# Patient Record
Sex: FEMALE | Race: WHITE | NOT HISPANIC OR LATINO | URBAN - METROPOLITAN AREA
[De-identification: names, ages, dates, MRNs, and addresses within clinical notes are randomized per-mention and may not be internally consistent; named-entity substitution may affect disease eponyms.]

---

## 2023-03-21 ENCOUNTER — TELEPHONE (OUTPATIENT)
Dept: SCHEDULING | Facility: CLINIC | Age: 68
End: 2023-03-21
Payer: COMMERCIAL

## 2023-03-21 NOTE — TELEPHONE ENCOUNTER
New Patient Appointment 5/8/23    Name of caller: Lurdes Martinez     Reason for Visit: Stress test     Insurance: personal Choice    Recent Cardiac Test/Procedures: none     Referred by: Felipe Drake Sr., DO    Previous Cardiologist name and phone number: none     Best contact number: [phone]    Additional notes/History: pt would like a sooner appt

## 2023-03-22 NOTE — TELEPHONE ENCOUNTER
New Patient Visit Questionnaire  Use the F2 key to move through the asterisks.  Reason for appointment? SOB    Who referred you: PCP    Name of primary care physician EJ GRIFFITH    Has the patient ever been seen by a cardiologist before?  NO               If YES, please obtain name and location of previous cardiologist.  Do you give us permission to obtain Medical records from the Providers listed? YES    Have you had any recent lab work performed? YES      If yes, where was this performed? DR GRIFFITH'S OFFICE    Have you ever had any cardiac testing (echo, stress test, carotid or aortic ultrasounds, cardiac MRI, etc.) NO    Have you ever had a cardiac catheterization, angioplasty, stent or heart surgery?  NO    Have you had any recent hospitalizations?  NO    If the patient has had previous testing/hospitalization, please determine where and when this was performed.  If the patient has copies of these reports or discharge summaries, please instruct them bring records to the visit.     PATIENT INSTRUCTIONS   Please bring a list of all your medications with the name of the medication, the dosage and how frequently you take the medication.  If you do not have a list, please bring all of your medication bottles with you.

## 2023-05-08 ENCOUNTER — TELEPHONE (OUTPATIENT)
Dept: CARDIOLOGY | Facility: CLINIC | Age: 68
End: 2023-05-08

## 2023-05-08 ENCOUNTER — OFFICE VISIT (OUTPATIENT)
Dept: CARDIOLOGY | Facility: CLINIC | Age: 68
End: 2023-05-08
Payer: COMMERCIAL

## 2023-05-08 VITALS
WEIGHT: 164 LBS | HEIGHT: 68 IN | RESPIRATION RATE: 16 BRPM | DIASTOLIC BLOOD PRESSURE: 90 MMHG | HEART RATE: 75 BPM | BODY MASS INDEX: 24.86 KG/M2 | SYSTOLIC BLOOD PRESSURE: 140 MMHG

## 2023-05-08 DIAGNOSIS — R06.09 DOE (DYSPNEA ON EXERTION): Primary | ICD-10-CM

## 2023-05-08 DIAGNOSIS — R06.02 SHORTNESS OF BREATH: ICD-10-CM

## 2023-05-08 DIAGNOSIS — E78.2 MIXED HYPERLIPIDEMIA: ICD-10-CM

## 2023-05-08 DIAGNOSIS — I10 BENIGN ESSENTIAL HYPERTENSION: ICD-10-CM

## 2023-05-08 PROCEDURE — 3008F BODY MASS INDEX DOCD: CPT | Performed by: INTERNAL MEDICINE

## 2023-05-08 PROCEDURE — 3080F DIAST BP >= 90 MM HG: CPT | Performed by: INTERNAL MEDICINE

## 2023-05-08 PROCEDURE — 99204 OFFICE O/P NEW MOD 45 MIN: CPT | Performed by: INTERNAL MEDICINE

## 2023-05-08 PROCEDURE — 3077F SYST BP >= 140 MM HG: CPT | Performed by: INTERNAL MEDICINE

## 2023-05-08 PROCEDURE — 93000 ELECTROCARDIOGRAM COMPLETE: CPT | Performed by: INTERNAL MEDICINE

## 2023-05-08 RX ORDER — ROSUVASTATIN CALCIUM 20 MG/1
20 TABLET, COATED ORAL DAILY
Qty: 90 TABLET | Refills: 3 | Status: SHIPPED | OUTPATIENT
Start: 2023-05-08 | End: 2024-04-03 | Stop reason: ALTCHOICE

## 2023-05-08 RX ORDER — LOSARTAN POTASSIUM 25 MG/1
25 TABLET ORAL DAILY
COMMUNITY
Start: 2023-05-01 | End: 2024-06-25 | Stop reason: SDUPTHER

## 2023-05-08 RX ORDER — CHOLECALCIFEROL (VITAMIN D3) 25 MCG
1000 TABLET ORAL DAILY
COMMUNITY

## 2023-05-08 RX ORDER — POLYDEXTROSE 1.5 G
1 TABLET,CHEWABLE ORAL DAILY
COMMUNITY
End: 2024-10-01

## 2023-05-08 RX ORDER — ASCORBIC ACID 500 MG
500 TABLET ORAL DAILY
COMMUNITY

## 2023-05-08 ASSESSMENT — ENCOUNTER SYMPTOMS
LIGHT-HEADEDNESS: 0
PALPITATIONS: 0
IRREGULAR HEARTBEAT: 0
NEAR-SYNCOPE: 0
ORTHOPNEA: 0
DIZZINESS: 0
SYNCOPE: 0
DYSPNEA ON EXERTION: 1
PND: 0

## 2023-05-08 NOTE — PROGRESS NOTES
Cardiology Consult/New Patient    Reason for visit: Shortness of breath    HPI Michelle presents today for evaluation.  She is a 67-year-old female with history of hypertension, hyperlipidemia and smoking who over the past 6 months has noted dyspnea on exertion when going up 1 flight of stairs.  She is fine on a flat surface or she does a bike at the gym but going up the incline seems to make her dyspneic.  She waits for 1 to 2 minutes and then resolves.  She does not have any chest discomfort or pressure.  There has not been any rest or nocturnal symptoms.  No lower extremity edema, PND orthopnea.    Michelle has not had any dizziness, near-syncope or syncope.  No palpitations.    She has a past medical history of breast cancer treated with lumpectomy and radiation.  There is hypertension, hyperlipidemia and smoking approximately 1/2 pack/day for 25 years.  No family history of premature atherosclerosis or sudden cardiac death.    Her blood pressure today in the office is mildly elevated 140/90 but she tells me that she is extremely anxious and her EKG is normal.    Medical History:   Past Medical History:   Diagnosis Date   • Benign essential hypertension 5/8/2023   • Degeneration of cervical intervertebral disc    • Malignant neoplasm of breast (CMS/HCC)    • Mixed hyperlipidemia 5/8/2023   • Nicotine dependence    • Shortness of breath    • Spondylosis        Surgical History:   Past Surgical History:   Procedure Laterality Date   • BREAST LUMPECTOMY Left        Family History:   Family History   Problem Relation Age of Onset   • Breast cancer Biological Mother    • Heart disease Biological Father    • Valvular heart disease Biological Father    • Diabetes Other         Social History:    Social History     Tobacco Use   • Smoking status: Every Day     Packs/day: 0.30     Types: Cigarettes   • Smokeless tobacco: Never   Substance Use Topics   • Alcohol use: Not Currently     Comment: occasionally   • Drug use: Never         Allergies: Patient has no known allergies.    Current Outpatient Medications   Medication Instructions   • ascorbic acid (VITAMIN C) 500 mg, oral, Daily   • calcium carbonate/vitamin D3 (CALTRATE 600 PLUS D ORAL) 1 tablet, oral, 2 times daily   • cholecalciferol (vitamin D3) 1,000 Units, oral, Daily   • losartan (COZAAR) 25 mg, oral, Daily   • multivitamin tablet 1 tablet, oral, Daily   • multivitamin with minerals (HAIR,SKIN AND NAILS) tablet 1 tablet, oral, Daily   • omega-3 fatty acids (FISH OIL CONCENTRATE ORAL) 1 capsule, oral, Daily   • rosuvastatin (CRESTOR) 20 mg, oral, Daily        Review of Systems  Review of Systems   Cardiovascular: Positive for dyspnea on exertion. Negative for chest pain, irregular heartbeat, leg swelling, near-syncope, orthopnea, palpitations, paroxysmal nocturnal dyspnea and syncope.   Musculoskeletal: Positive for joint pain.   Neurological: Negative for dizziness and light-headedness.       Objective     Vitals:    05/08/23 0856   BP: 140/90   Pulse: 75   Resp: 16       Physical Exam  Constitutional:       Appearance: She is well-developed.   Neck:      Vascular: No carotid bruit or JVD.   Cardiovascular:      Rate and Rhythm: Normal rate and regular rhythm.      Pulses:           Carotid pulses are 2+ on the right side and 2+ on the left side.       Dorsalis pedis pulses are 2+ on the right side and 2+ on the left side.        Posterior tibial pulses are 2+ on the right side and 2+ on the left side.      Heart sounds: S1 normal and S2 normal. No murmur heard.     No friction rub. No gallop.   Pulmonary:      Effort: Pulmonary effort is normal.      Breath sounds: Normal breath sounds.   Musculoskeletal:      Right lower leg: No edema.      Left lower leg: No edema.   Skin:     General: Skin is warm and dry.   Neurological:      Mental Status: She is alert.   Psychiatric:         Behavior: Behavior normal.              ECG.  Normal sinus rhythm.  Normal  EKG      Assessment/Plan     Benign essential hypertension  Blood pressure was mildly elevated today at 140/90 in both arms.  Jim is currently on losartan 25 mg daily.    If her blood pressure is elevated on subsequent visits this can be uptitrated as needed for control of her blood pressure.  She had an ACE cough with lisinopril.    Shortness of breath  In order to further evaluate Michelle symptoms of dyspnea on exertion, I will have her get an exercise nuclear stress test.  I will also have her get an echocardiogram to assess her underlying LV function wall motion and valves.    Her EKG is normal but she has multiple risk factors for coronary artery disease including smoking.    Her dyspnea may be related to her longstanding smoking history and deconditioning but we will look to rule out a cardiac etiology.    I have counseled him on the need for smoking cessation and discussed with her the risk for lung cancer, emphysema, COPD, coronary artery disease and stroke.    Mixed hyperlipidemia  Michelle had a lipid profile in March which her total cholesterol was 284, triglycerides 130, HDL was 86 and LDL was 175.    She had an ASCVD risk score of 15.8%.  I have started her on rosuvastatin 20 mg daily.  She will get a repeat lipid profile and CMP in 3 months.  I reviewed with her the potential side effects.    I counseled her on a low-fat low-cholesterol Mediterranean diet and have given her information on this.  I have also discussed with her structured exercise program.    We had a prolonged discussion about these complex clinical issues and went over the various important aspects to consider. All questions were answered.      Michelle will follow up with me after the tests are performed.  I will keep you informed the results as well as her progress.    If you have any questions, if I can be of any further assistance, please do not hesitate to contact me.    I spent 45 minutes on this date of service performing the following  activities: obtaining history, performing examination, entering orders, documenting, preparing for visit, obtaining / reviewing records, providing counseling and education and communicating results.         Declan Jackson MD  5/8/2023

## 2023-05-08 NOTE — ASSESSMENT & PLAN NOTE
Blood pressure was mildly elevated today at 140/90 in both arms.  Jim is currently on losartan 25 mg daily.    If her blood pressure is elevated on subsequent visits this can be uptitrated as needed for control of her blood pressure.  She had an ACE cough with lisinopril.

## 2023-05-08 NOTE — ASSESSMENT & PLAN NOTE
Michelle had a lipid profile in March which her total cholesterol was 284, triglycerides 130, HDL was 86 and LDL was 175.    She had an ASCVD risk score of 15.8%.  I have started her on rosuvastatin 20 mg daily.  She will get a repeat lipid profile and CMP in 3 months.  I reviewed with her the potential side effects.    I counseled her on a low-fat low-cholesterol Mediterranean diet and have given her information on this.  I have also discussed with her structured exercise program.

## 2023-05-08 NOTE — PATIENT INSTRUCTIONS
Patient Education   Mediterranean Diet  A Mediterranean diet refers to food and lifestyle choices that are based on the traditions of countries located on the [place] Sea. This way of eating has been shown to help prevent certain conditions and improve outcomes forpeople who have chronic diseases, like kidney disease and heart disease.  What are tips for following this plan?  Lifestyle  Cook and eat meals together with your family, when possible.  Drink enough fluid to keep your urine clear or pale yellow.  Be physically active every day. This includes:  Aerobic exercise like running or swimming.  Leisure activities like gardening, walking, or housework.  Get 7-8 hours of sleep each night.  If recommended by your health care provider, drink red wine in moderation. This means 1 glass a day for nonpregnant women and 2 glasses a day for men. A glass of wine equals 5 oz (150 mL).  Reading food labels    Check the serving size of packaged foods. For foods such as rice and pasta, the serving size refers to the amount of cooked product, not dry.  Check the total fat in packaged foods. Avoid foods that have saturated fat or trans fats.  Check the ingredients list for added sugars, such as corn syrup.    Shopping  At the grocery store, buy most of your food from the areas near the walls of the store. This includes:  Fresh fruits and vegetables (produce).  Grains, beans, nuts, and seeds. Some of these may be available in unpackaged forms or large amounts (in bulk).  Fresh seafood.  Poultry and eggs.  Low-fat dairy products.  Buy whole ingredients instead of prepackaged foods.  Buy fresh fruits and vegetables in-season from local farmers markets.  Buy frozen fruits and vegetables in resealable bags.  If you do not have access to quality fresh seafood, buy precooked frozen shrimp or canned fish, such as tuna, salmon, or sardines.  Buy small amounts of raw or cooked vegetables, salads, or olives from the deli or salad bar  at your store.  Stock your pantry so you always have certain foods on hand, such as olive oil, canned tuna, canned tomatoes, rice, pasta, and beans.  Cooking  Cook foods with extra-virgin olive oil instead of using butter or other vegetable oils.  Have meat as a side dish, and have vegetables or grains as your main dish. This means having meat in small portions or adding small amounts of meat to foods like pasta or stew.  Use beans or vegetables instead of meat in common dishes like chili or lasagna.  [place] with different cooking methods. Try roasting or broiling vegetables instead of steaming or sautéeing them.  Add frozen vegetables to soups, stews, pasta, or rice.  Add nuts or seeds for added healthy fat at each meal. You can add these to yogurt, salads, or vegetable dishes.  Marinate fish or vegetables using olive oil, lemon juice, garlic, and fresh herbs.  Meal planning    Plan to eat 1 vegetarian meal one day each week. Try to work up to 2 vegetarian meals, if possible.  Eat seafood 2 or more times a week.  Have healthy snacks readily available, such as:  Vegetable sticks with hummus.  Greek yogurt.  Fruit and nut trail mix.  Eat balanced meals throughout the week. This includes:  Fruit: 2-3 servings a day  Vegetables: 4-5 servings a day  Low-fat dairy: 2 servings a day  Fish, poultry, or lean meat: 1 serving a day  Beans and legumes: 2 or more servings a week  Nuts and seeds: 1-2 servings a day  Whole grains: 6-8 servings a day  Extra-virgin olive oil: 3-4 servings a day  Limit red meat and sweets to only a few servings a month    What are my food choices?  Mediterranean diet  Recommended  Grains: Whole-grain pasta. Brown rice. Bulgar wheat. Polenta. Couscous. Whole-wheat bread. Oatmeal. Quinoa.  Vegetables: Artichokes. Beets. Broccoli. Cabbage. Carrots. Eggplant. Green beans. Chard. Kale. Spinach. Onions. Leeks. Peas. Squash. Tomatoes. Peppers. Radishes.  Fruits: Apples. Apricots. Avocado. Berries.  Bananas. Cherries. Dates. Figs. Grapes. Breanna. Melon. Oranges. Peaches. Plums. Pomegranate.  Meats and other protein foods: Beans. Almonds. Sunflower seeds. Pine nuts. Peanuts. Cod. [place]. Scallops. Shrimp. Tuna. Tilapia. Clams. Oysters. Eggs.  Dairy: Low-fat milk. Cheese. Greek yogurt.  Beverages: Water. Red wine. Herbal tea.  Fats and oils: Extra virgin olive oil. Avocado oil. Grape seed oil.  Sweets and desserts: Greek yogurt with honey. Baked apples. Poached pears. Trail mix.  Seasoning and other foods: Basil. Cilantro. Coriander. Cumin. Mint. Parsley. Chris. Rosemary. Tarragon. Garlic. Oregano. Thyme. Pepper. Balsalmic vinegar. Tahini. Hummus. Tomato sauce. Olives. Mushrooms.  Limit these  Grains: Prepackaged pasta or rice dishes. Prepackaged cereal with added sugar.  Vegetables: Deep fried potatoes (french fries).  Fruits: Fruit canned in syrup.  Meats and other protein foods: Beef. Pork. Lamb. Poultry with skin. Hot dogs. Patricia.  Dairy: Ice cream. Sour cream. Whole milk.  Beverages: Juice. Sugar-sweetened soft drinks. Beer. Liquor and spirits.  Fats and oils: Butter. Canola oil. Vegetable oil. Beef fat (tallow). Lard.  Sweets and desserts: Cookies. Cakes. Pies. Candy.  Seasoning and other foods: Mayonnaise. Premade sauces and marinades.  The items listed may not be a complete list. Talk with your dietitian aboutwhat dietary choices are right for you.  Summary  The Mediterranean diet includes both food and lifestyle choices.  Eat a variety of fresh fruits and vegetables, beans, nuts, seeds, and whole grains.  Limit the amount of red meat and sweets that you eat.  Talk with your health care provider about whether it is safe for you to drink red wine in moderation. This means 1 glass a day for nonpregnant women and 2 glasses a day for men. A glass of wine equals 5 oz (150 mL).  This information is not intended to replace advice given to you by your health care provider. Make sure you discuss any questions you  have with your healthcare provider.  Document Revised: 08/17/2017 Document Reviewed: 08/10/2017  Elsevier Patient Education © 2020 [employer] Inc.

## 2023-05-08 NOTE — ASSESSMENT & PLAN NOTE
In order to further evaluate Michelle symptoms of dyspnea on exertion, I will have her get an exercise nuclear stress test.  I will also have her get an echocardiogram to assess her underlying LV function wall motion and valves.    Her EKG is normal but she has multiple risk factors for coronary artery disease including smoking.    Her dyspnea may be related to her longstanding smoking history and deconditioning but we will look to rule out a cardiac etiology.    I have counseled him on the need for smoking cessation and discussed with her the risk for lung cancer, emphysema, COPD, coronary artery disease and stroke.

## 2023-05-08 NOTE — LETTER
May 8, 2023     Felipe Drake Sr., DO  [address] [facility] [address]    Patient: Michelle Chatman  YOB: 1955  Date of Visit: 5/8/2023      Dear Dr. Drake:    Thank you for referring Michelle Chatman to me for evaluation. Below are my notes for this consultation.    If you have questions, please do not hesitate to call me. I look forward to following your patient along with you.         Sincerely,        Declan Jackson MD        CC: No Recipients    Declan Jackson MD  5/8/2023  9:35 AM  Signed  Cardiology Consult/New Patient    Reason for visit: Shortness of breath    JUAN Martinez presents today for evaluation.  She is a 67-year-old female with history of hypertension, hyperlipidemia and smoking who over the past 6 months has noted dyspnea on exertion when going up 1 flight of stairs.  She is fine on a flat surface or she does a bike at the gym but going up the incline seems to make her dyspneic.  She waits for 1 to 2 minutes and then resolves.  She does not have any chest discomfort or pressure.  There has not been any rest or nocturnal symptoms.  No lower extremity edema, PND orthopnea.    Michelle has not had any dizziness, near-syncope or syncope.  No palpitations.    She has a past medical history of breast cancer treated with lumpectomy and radiation.  There is hypertension, hyperlipidemia and smoking approximately 1/2 pack/day for 25 years.  No family history of premature atherosclerosis or sudden cardiac death.    Her blood pressure today in the office is mildly elevated 140/90 but she tells me that she is extremely anxious and her EKG is normal.    Medical History:   Past Medical History:   Diagnosis Date   • Benign essential hypertension 5/8/2023   • Degeneration of cervical intervertebral disc    • Malignant neoplasm of breast (CMS/HCC)    • Mixed hyperlipidemia 5/8/2023   • Nicotine dependence    • Shortness of breath    • Spondylosis        Surgical History:   Past Surgical History:    Procedure Laterality Date   • BREAST LUMPECTOMY Left        Family History:   Family History   Problem Relation Age of Onset   • Breast cancer Biological Mother    • Heart disease Biological Father    • Valvular heart disease Biological Father    • Diabetes Other         Social History:    Social History     Tobacco Use   • Smoking status: Every Day     Packs/day: 0.30     Types: Cigarettes   • Smokeless tobacco: Never   Substance Use Topics   • Alcohol use: Not Currently     Comment: occasionally   • Drug use: Never        Allergies: Patient has no known allergies.    Current Outpatient Medications   Medication Instructions   • ascorbic acid (VITAMIN C) 500 mg, oral, Daily   • calcium carbonate/vitamin D3 (CALTRATE 600 PLUS D ORAL) 1 tablet, oral, 2 times daily   • cholecalciferol (vitamin D3) 1,000 Units, oral, Daily   • losartan (COZAAR) 25 mg, oral, Daily   • multivitamin tablet 1 tablet, oral, Daily   • multivitamin with minerals (HAIR,SKIN AND NAILS) tablet 1 tablet, oral, Daily   • omega-3 fatty acids (FISH OIL CONCENTRATE ORAL) 1 capsule, oral, Daily   • rosuvastatin (CRESTOR) 20 mg, oral, Daily        Review of Systems  Review of Systems   Cardiovascular: Positive for dyspnea on exertion. Negative for chest pain, irregular heartbeat, leg swelling, near-syncope, orthopnea, palpitations, paroxysmal nocturnal dyspnea and syncope.   Musculoskeletal: Positive for joint pain.   Neurological: Negative for dizziness and light-headedness.       Objective     Vitals:    05/08/23 0856   BP: 140/90   Pulse: 75   Resp: 16       Physical Exam  Constitutional:       Appearance: She is well-developed.   Neck:      Vascular: No carotid bruit or JVD.   Cardiovascular:      Rate and Rhythm: Normal rate and regular rhythm.      Pulses:           Carotid pulses are 2+ on the right side and 2+ on the left side.       Dorsalis pedis pulses are 2+ on the right side and 2+ on the left side.        Posterior tibial pulses are 2+  on the right side and 2+ on the left side.      Heart sounds: S1 normal and S2 normal. No murmur heard.     No friction rub. No gallop.   Pulmonary:      Effort: Pulmonary effort is normal.      Breath sounds: Normal breath sounds.   Musculoskeletal:      Right lower leg: No edema.      Left lower leg: No edema.   Skin:     General: Skin is warm and dry.   Neurological:      Mental Status: She is alert.   Psychiatric:         Behavior: Behavior normal.              ECG.  Normal sinus rhythm.  Normal EKG      Assessment/Plan     Benign essential hypertension  Blood pressure was mildly elevated today at 140/90 in both arms.  Jim is currently on losartan 25 mg daily.    If her blood pressure is elevated on subsequent visits this can be uptitrated as needed for control of her blood pressure.  She had an ACE cough with lisinopril.    Shortness of breath  In order to further evaluate Michelle symptoms of dyspnea on exertion, I will have her get an exercise nuclear stress test.  I will also have her get an echocardiogram to assess her underlying LV function wall motion and valves.    Her EKG is normal but she has multiple risk factors for coronary artery disease including smoking.    Her dyspnea may be related to her longstanding smoking history and deconditioning but we will look to rule out a cardiac etiology.    I have counseled him on the need for smoking cessation and discussed with her the risk for lung cancer, emphysema, COPD, coronary artery disease and stroke.    Mixed hyperlipidemia  Michelle had a lipid profile in March which her total cholesterol was 284, triglycerides 130, HDL was 86 and LDL was 175.    She had an ASCVD risk score of 15.8%.  I have started her on rosuvastatin 20 mg daily.  She will get a repeat lipid profile and CMP in 3 months.  I reviewed with her the potential side effects.    I counseled her on a low-fat low-cholesterol Mediterranean diet and have given her information on this.  I have also  discussed with her structured exercise program.    We had a prolonged discussion about these complex clinical issues and went over the various important aspects to consider. All questions were answered.      Michelle will follow up with me after the tests are performed.  I will keep you informed the results as well as her progress.    If you have any questions, if I can be of any further assistance, please do not hesitate to contact me.    I spent 45 minutes on this date of service performing the following activities: obtaining history, performing examination, entering orders, documenting, preparing for visit, obtaining / reviewing records, providing counseling and education and communicating results.        Declan Jackson MD  5/8/2023

## 2023-05-08 NOTE — TELEPHONE ENCOUNTER
Cherise !! Michelle is scheduled for both a NST and ECHO in the [place] office on 2023    Insurance is Personal Choice   ID # [id number]   1955    Will need precerts  LUBNAX

## 2023-05-09 NOTE — TELEPHONE ENCOUNTER
Order ID: [id number]       Authorized for echo([id number])    Approval Valid Through: 05/09/2023 - 08/06/2023      Order ID: [id number]       Authorized for Nuclear stress test([id number])    Approval Valid Through: 05/09/2023 - 08/06/2023    Both approved for [facility] office

## 2023-06-05 ENCOUNTER — APPOINTMENT (OUTPATIENT)
Dept: CARDIOLOGY | Facility: CLINIC | Age: 68
End: 2023-06-05
Attending: INTERNAL MEDICINE
Payer: COMMERCIAL

## 2023-06-05 ENCOUNTER — HOSPITAL ENCOUNTER (OUTPATIENT)
Dept: CARDIOLOGY | Facility: CLINIC | Age: 68
Setting detail: NUCLEAR MEDICINE
Discharge: HOME | End: 2023-06-05
Attending: INTERNAL MEDICINE
Payer: COMMERCIAL

## 2023-06-05 ENCOUNTER — APPOINTMENT (OUTPATIENT)
Dept: CARDIOLOGY | Facility: CLINIC | Age: 68
Setting detail: NUCLEAR MEDICINE
End: 2023-06-05
Attending: INTERNAL MEDICINE
Payer: COMMERCIAL

## 2023-06-05 VITALS
WEIGHT: 164 LBS | HEIGHT: 67 IN | DIASTOLIC BLOOD PRESSURE: 90 MMHG | SYSTOLIC BLOOD PRESSURE: 140 MMHG | BODY MASS INDEX: 25.74 KG/M2

## 2023-06-05 DIAGNOSIS — R06.02 SHORTNESS OF BREATH: ICD-10-CM

## 2023-06-05 LAB
AORTIC ROOT ANNULUS: 3.2 CM
ASCENDING AORTA: 3.6 CM
AV PEAK GRADIENT: 9 MMHG
AV PEAK VELOCITY-S: 1.5 M/S
AV VALVE AREA: 2.03 CM2
BSA FOR ECHO PROCEDURE: 1.88 M2
CUSP SEPARATION: 1.8 CM
CV NM TETROFOSMIN REST DOSE: 9.6 MCI
CV NM TETROFOSMIN STRESS DOSE: 28.5 MCI
E WAVE DECELERATION TIME: 239 MS
E/A RATIO: 0.9
E/E' RATIO: 9.1
E/LAT E' RATIO: 6.4
EDV (BP): 68.5 CM3
EF (A4C): 69.6 %
EF A2C: 65.8 %
EJECTION FRACTION: 65.8 %
ESV (BP): 23.4 CM3
FRACTIONAL SHORTENING: 37.19 %
INTERVENTRICULAR SEPTUM: 1 CM
LA ESV (BP): 43.8 CM3
LA ESV INDEX (A2C): 21.54 CM3/M2
LA ESV INDEX (BP): 23.3 CM3/M2
LA/AORTA RATIO: 1.16
LAAS-AP2: 16.5 CM2
LAAS-AP4: 16.8 CM2
LAD 2D: 3.7 CM
LALD A4C: 5 CM
LALD A4C: 5.33 CM
LAV-S: 40.5 CM3
LEFT ATRIUM VOLUME INDEX: 23.72 CM3/M2
LEFT ATRIUM VOLUME: 44.6 CM3
LEFT INTERNAL DIMENSION IN SYSTOLE: 2.82 CM (ref 2.6–3.93)
LEFT VENTRICLE DIASTOLIC VOLUME INDEX: 38.14 CM3/M2
LEFT VENTRICLE DIASTOLIC VOLUME: 71.7 CM3
LEFT VENTRICLE SYSTOLIC VOLUME INDEX: 11.6 CM3/M2
LEFT VENTRICLE SYSTOLIC VOLUME: 21.8 CM3
LEFT VENTRICULAR INTERNAL DIMENSION IN DIASTOLE: 4.49 CM (ref 4.39–6.1)
LEFT VENTRICULAR POSTERIOR WALL IN END DIASTOLE: 1.03 CM (ref 0.58–1.07)
LV DIASTOLIC VOLUME: 65 CM3
LV ESV (APICAL 2 CHAMBER): 22.2 CM3
LVAD-AP2: 23.3 CM2
LVAD-AP4: 24.1 CM2
LVAS-AP2: 12.3 CM2
LVAS-AP4: 11 CM2
LVEDVI(A2C): 34.57 CM3/M2
LVEDVI(BP): 36.44 CM3/M2
LVESVI(A2C): 11.81 CM3/M2
LVESVI(BP): 12.45 CM3/M2
LVLD-AP2: 6.84 CM
LVLD-AP4: 6.77 CM
LVLS-AP2: 6.02 CM
LVLS-AP4: 5.24 CM
LVOT 2D: 2.2 CM
LVOT A: 3.8 CM2
LVOT PEAK VELOCITY: 1.02 M/S
LVOT PG: 4 MMHG
MLH CV ECHO AVA INDEX VELOCITY RATIO: 1.1
MLH CV NM REST ADMIN DATE: NORMAL MM/DD/YYYY
MLH CV NM REST ADMIN TIME: 821 HR:MIN
MLH CV NM STRESS ADMIN DATE: NORMAL MM/DD/YYYY
MLH CV NM STRESS ADMIN TIME: 1020 HR:MIN
MV E'TISSUE VEL-LAT: 0.09 M/S
MV E'TISSUE VEL-MED: 0.07 M/S
MV PEAK A VEL: 0.69 M/S
MV PEAK E VEL: 0.59 M/S
NUC STRESS EJECTION FRACTION: 67 %
POSTERIOR WALL: 1.03 CM
PV MEAN GRADIENT: 3 MMHG
PV MEAN VELOCITY: 0.87 M/S
PV PEAK GRADIENT: 6 MMHG
PV PV: 1.27 M/S
RVOT VMAX: 0.91 M/S
RVOT VTI: 17.2 CM
SEPTAL TISSUE DOPPLER FREE WALL LATE DIA VELOCITY (APICAL 4 CHAMBER VIEW): 0.15 M/S
STRESS BASELINE BP: NORMAL MMHG
STRESS BASELINE HR: 76 BPM
STRESS PERCENT HR: 86 %
STRESS POST ESTIMATED WORKLOAD: 7.2 METS
STRESS POST EXERCISE DUR MIN: 6 MIN
STRESS POST EXERCISE DUR SEC: 6 SEC
STRESS POST PEAK BP: NORMAL MMHG
STRESS POST PEAK HR: 133 BPM
STRESS TARGET HR: 132 BPM
Z-SCORE OF LEFT VENTRICULAR DIMENSION IN END DIASTOLE: -1.42
Z-SCORE OF LEFT VENTRICULAR DIMENSION IN END SYSTOLE: -0.98
Z-SCORE OF LEFT VENTRICULAR POSTERIOR WALL IN END DIASTOLE: 1.42

## 2023-06-05 PROCEDURE — 93015 CV STRESS TEST SUPVJ I&R: CPT | Performed by: NURSE PRACTITIONER

## 2023-06-05 PROCEDURE — 78452 HT MUSCLE IMAGE SPECT MULT: CPT | Performed by: NURSE PRACTITIONER

## 2023-06-05 PROCEDURE — A9502 TC99M TETROFOSMIN: HCPCS | Performed by: NURSE PRACTITIONER

## 2023-06-05 PROCEDURE — 93306 TTE W/DOPPLER COMPLETE: CPT | Performed by: INTERNAL MEDICINE

## 2023-06-08 ENCOUNTER — TELEPHONE (OUTPATIENT)
Dept: SCHEDULING | Facility: CLINIC | Age: 68
End: 2023-06-08
Payer: COMMERCIAL

## 2023-06-08 NOTE — TELEPHONE ENCOUNTER
Pt is currently waiting at "[facility] as per discussion with Dr Jackson but lab slips are made for [facility]. Pt is asking for the lab slips to be sent to [phone]    Pt can be reached at [phone]

## 2023-06-15 ENCOUNTER — OFFICE VISIT (OUTPATIENT)
Dept: CARDIOLOGY | Facility: CLINIC | Age: 68
End: 2023-06-15
Payer: COMMERCIAL

## 2023-06-15 VITALS
SYSTOLIC BLOOD PRESSURE: 130 MMHG | RESPIRATION RATE: 20 BRPM | WEIGHT: 160 LBS | BODY MASS INDEX: 25.11 KG/M2 | HEIGHT: 67 IN | DIASTOLIC BLOOD PRESSURE: 78 MMHG

## 2023-06-15 DIAGNOSIS — I10 BENIGN ESSENTIAL HYPERTENSION: ICD-10-CM

## 2023-06-15 DIAGNOSIS — R06.02 SHORTNESS OF BREATH: ICD-10-CM

## 2023-06-15 DIAGNOSIS — E78.2 MIXED HYPERLIPIDEMIA: Primary | ICD-10-CM

## 2023-06-15 PROCEDURE — 99214 OFFICE O/P EST MOD 30 MIN: CPT | Performed by: INTERNAL MEDICINE

## 2023-06-15 PROCEDURE — 3078F DIAST BP <80 MM HG: CPT | Performed by: INTERNAL MEDICINE

## 2023-06-15 PROCEDURE — 3008F BODY MASS INDEX DOCD: CPT | Performed by: INTERNAL MEDICINE

## 2023-06-15 PROCEDURE — 3075F SYST BP GE 130 - 139MM HG: CPT | Performed by: INTERNAL MEDICINE

## 2023-06-15 ASSESSMENT — ENCOUNTER SYMPTOMS
SYNCOPE: 0
NEAR-SYNCOPE: 0
ORTHOPNEA: 0
IRREGULAR HEARTBEAT: 0
PALPITATIONS: 0
PND: 0
DIZZINESS: 0
DYSPNEA ON EXERTION: 0
LIGHT-HEADEDNESS: 0

## 2023-06-15 NOTE — ASSESSMENT & PLAN NOTE
Blood pressure is controlled.  Michelle will continue on the losartan 25 mg daily.  This can be uptitrated as needed for control of her blood pressure.

## 2023-06-15 NOTE — ASSESSMENT & PLAN NOTE
Continue rosuvastatin 20 mg daily.  Michelle had an excellent response to the Crestor.  Her LDL in March was 175 and yesterday her blood work revealed an LDL of 91.    She will continue on the rosuvastatin and fish oil.  I have counseled her on the continued need for diet and exercise.  I have given her information on the Mediterranean diet.

## 2023-06-15 NOTE — PROGRESS NOTES
Cardiology Note       Reason for visit: Follow-up from tests.      Michelle returns today for follow-up.  She tells me she is feeling better.  She still gets some intermittent shortness of breath.  She still persistent smoking.    Michelle had undergone an exercise nuclear stress test which was negative for ischemia.  Her gated images revealed normal wall motion and ejection fraction of 67%.    Her echocardiogram revealed normal right and left ventricular systolic function with no regional wall motion abnormalities.  Normal diastolic filling pattern.  There was trace mitral regurgitation.    Michelle has not had any lower extremity edema, PND orthopnea.  There has not been any dizziness, near-syncope or syncope.  She has started going back to the gym.        Past Medical History:   Diagnosis Date   • Benign essential hypertension 05/08/2023   • Bursitis of hip, right 04/19/2023   • Degeneration of cervical intervertebral disc    • Malignant neoplasm of breast (CMS/HCC)    • Mixed hyperlipidemia 05/08/2023   • Nicotine dependence    • Shortness of breath    • Spondylosis      Past Surgical History:   Procedure Laterality Date   • BREAST LUMPECTOMY Left      Social History     Tobacco Use   • Smoking status: Every Day     Packs/day: 0.30     Types: Cigarettes   • Smokeless tobacco: Never   Substance Use Topics   • Alcohol use: Not Currently     Comment: occasionally   • Drug use: Never      Family History   Problem Relation Age of Onset   • Breast cancer Biological Mother    • Heart disease Biological Father    • Valvular heart disease Biological Father    • Diabetes Other      Patient has no known allergies.  Current Outpatient Medications   Medication Instructions   • ascorbic acid (VITAMIN C) 500 mg, oral, Daily   • calcium carbonate/vitamin D3 (CALTRATE 600 PLUS D ORAL) 1 tablet, oral, 2 times daily   • cholecalciferol (vitamin D3) 1,000 Units, oral, Daily   • losartan (COZAAR) 25 mg, oral, Daily   • multivitamin tablet  1 tablet, oral, Daily   • multivitamin with minerals (HAIR,SKIN AND NAILS) tablet 1 tablet, oral, Daily   • omega-3 fatty acids (FISH OIL CONCENTRATE ORAL) 1 capsule, oral, Daily   • rosuvastatin (CRESTOR) 20 mg, oral, Daily          Review of Systems   Eyes: Negative for visual disturbance.   Cardiovascular: Negative for chest pain, dyspnea on exertion, irregular heartbeat, leg swelling, near-syncope, orthopnea, palpitations, paroxysmal nocturnal dyspnea and syncope.   Neurological: Negative for dizziness and light-headedness.      Objective    Vitals:    06/15/23 1433   BP: 130/78   Resp: 20      Wt Readings from Last 3 Encounters:   06/15/23 72.6 kg (160 lb)   06/05/23 74.4 kg (164 lb)   05/08/23 74.4 kg (164 lb)      Physical Exam  Constitutional:       Appearance: She is well-developed.   Neck:      Vascular: No carotid bruit or JVD.   Cardiovascular:      Rate and Rhythm: Normal rate and regular rhythm.      Pulses:           Carotid pulses are 2+ on the right side and 2+ on the left side.       Dorsalis pedis pulses are 2+ on the right side and 2+ on the left side.        Posterior tibial pulses are 2+ on the right side and 2+ on the left side.      Heart sounds: S1 normal and S2 normal. No murmur heard.     No friction rub. No gallop.   Pulmonary:      Effort: Pulmonary effort is normal.      Breath sounds: Normal breath sounds.   Musculoskeletal:      Right lower leg: No edema.      Left lower leg: No edema.   Skin:     General: Skin is warm and dry.   Neurological:      Mental Status: She is alert.   Psychiatric:         Behavior: Behavior normal.                     Transthoracic echocardiogram.  6/5/2023  •  Left Ventricle: Normal ventricle size. Normal wall thickness. Normal systolic function. Estimated EF 65-70%. No regional wall motion abnormalities. Normal diastolic filling pattern for age.  •  Right Ventricle: Normal ventricle size. Normal systolic function.  •  Left Atrium: Normal sized atrium.  •   Right Atrium: Normal sized atrium.  •  Aortic Valve: Tricuspid valve. Trace regurgitation. No stenosis.  •  Mitral Valve: Normal leaflet structure. Normal leaflet motion. Trace regurgitation. No stenosis.  •  Tricuspid Valve: Normal structure. Jet is insufficient to calculate pulmonary artery pressure. Trace regurgitation.  •  Aortic root and ascending aorta are normal in caliber.         Exercise nuclear stress test.  6/5/2023.  1.  Exercise tolerance was fair.  2.  There were no exercise-induced arrhythmias.  3.  Appropriate heart rate and blood pressure response to exercise.  4.  The EKG does not meet diagnostic criteria for exercise induced ischemia.  5.  Probably normal nuclear perfusion scan.  There is a small apical defect which is fixed and most consistent with breast attenuation artifact.  No ischemia seen.  6.  Gated images: Normal wall motion.  The left ventricular ejection fraction is 67%.  No prior stress test for comparison.       Assessment/Plan    Shortness of breath  I do not have a definitive cardiac etiology for Michelle's dyspnea.  I suspect there is multifactorial but most likely related to her smoking history.    I have again counseled her on the need for smoking cessation.  I discussed with her a structured exercise program as well as diet and weight loss.    Mixed hyperlipidemia  Continue rosuvastatin 20 mg daily.  Michelle had an excellent response to the Crestor.  Her LDL in March was 175 and yesterday her blood work revealed an LDL of 91.    She will continue on the rosuvastatin and fish oil.  I have counseled her on the continued need for diet and exercise.  I have given her information on the Mediterranean diet.    Benign essential hypertension  Blood pressure is controlled.  Michelle will continue on the losartan 25 mg daily.  This can be uptitrated as needed for control of her blood pressure.    Michelle will be coming due for her day-to-day care.  I have asked her to return to see me on an  as-needed basis.  She will call with any questions or concerns in the interim.            Thank you for allowing me to participate in the care of this patient.  If you have any questions please don't hesitate to contact me.    I spent 30 minutes on this date of service performing the following activities: obtaining history, performing examination, entering orders, documenting, preparing for visit, obtaining / reviewing records, providing counseling and education and communicating results.     Declan Jackson MD [facility]   6/15/2023  3:19 PM

## 2023-06-15 NOTE — LETTER
Dory 15, 2023     Felipe Drake Sr., DO  [address] [facility] [address]    Patient: Michelle Chatman  YOB: 1955  Date of Visit: 6/15/2023      Dear Dr. Drake:    Thank you for referring Michelle Chatman to me for evaluation. Below are my notes for this consultation.    If you have questions, please do not hesitate to call me. I look forward to following your patient along with you.         Sincerely,        Declan Jackson MD        CC: No Recipients    Declan Jackson MD  6/15/2023  3:20 PM  Signed     Cardiology Note       Reason for visit: Follow-up from tests.      Michelle returns today for follow-up.  She tells me she is feeling better.  She still gets some intermittent shortness of breath.  She still persistent smoking.    Michelle had undergone an exercise nuclear stress test which was negative for ischemia.  Her gated images revealed normal wall motion and ejection fraction of 67%.    Her echocardiogram revealed normal right and left ventricular systolic function with no regional wall motion abnormalities.  Normal diastolic filling pattern.  There was trace mitral regurgitation.    Michelle has not had any lower extremity edema, PND orthopnea.  There has not been any dizziness, near-syncope or syncope.  She has started going back to the gym.        Past Medical History:   Diagnosis Date   • Benign essential hypertension 05/08/2023   • Bursitis of hip, right 04/19/2023   • Degeneration of cervical intervertebral disc    • Malignant neoplasm of breast (CMS/HCC)    • Mixed hyperlipidemia 05/08/2023   • Nicotine dependence    • Shortness of breath    • Spondylosis      Past Surgical History:   Procedure Laterality Date   • BREAST LUMPECTOMY Left      Social History     Tobacco Use   • Smoking status: Every Day     Packs/day: 0.30     Types: Cigarettes   • Smokeless tobacco: Never   Substance Use Topics   • Alcohol use: Not Currently     Comment: occasionally   • Drug use: Never      Family  History   Problem Relation Age of Onset   • Breast cancer Biological Mother    • Heart disease Biological Father    • Valvular heart disease Biological Father    • Diabetes Other      Patient has no known allergies.  Current Outpatient Medications   Medication Instructions   • ascorbic acid (VITAMIN C) 500 mg, oral, Daily   • calcium carbonate/vitamin D3 (CALTRATE 600 PLUS D ORAL) 1 tablet, oral, 2 times daily   • cholecalciferol (vitamin D3) 1,000 Units, oral, Daily   • losartan (COZAAR) 25 mg, oral, Daily   • multivitamin tablet 1 tablet, oral, Daily   • multivitamin with minerals (HAIR,SKIN AND NAILS) tablet 1 tablet, oral, Daily   • omega-3 fatty acids (FISH OIL CONCENTRATE ORAL) 1 capsule, oral, Daily   • rosuvastatin (CRESTOR) 20 mg, oral, Daily          Review of Systems   Eyes: Negative for visual disturbance.   Cardiovascular: Negative for chest pain, dyspnea on exertion, irregular heartbeat, leg swelling, near-syncope, orthopnea, palpitations, paroxysmal nocturnal dyspnea and syncope.   Neurological: Negative for dizziness and light-headedness.      Objective    Vitals:    06/15/23 1433   BP: 130/78   Resp: 20      Wt Readings from Last 3 Encounters:   06/15/23 72.6 kg (160 lb)   06/05/23 74.4 kg (164 lb)   05/08/23 74.4 kg (164 lb)      Physical Exam  Constitutional:       Appearance: She is well-developed.   Neck:      Vascular: No carotid bruit or JVD.   Cardiovascular:      Rate and Rhythm: Normal rate and regular rhythm.      Pulses:           Carotid pulses are 2+ on the right side and 2+ on the left side.       Dorsalis pedis pulses are 2+ on the right side and 2+ on the left side.        Posterior tibial pulses are 2+ on the right side and 2+ on the left side.      Heart sounds: S1 normal and S2 normal. No murmur heard.     No friction rub. No gallop.   Pulmonary:      Effort: Pulmonary effort is normal.      Breath sounds: Normal breath sounds.   Musculoskeletal:      Right lower leg: No edema.       Left lower leg: No edema.   Skin:     General: Skin is warm and dry.   Neurological:      Mental Status: She is alert.   Psychiatric:         Behavior: Behavior normal.                     Transthoracic echocardiogram.  6/5/2023  •  Left Ventricle: Normal ventricle size. Normal wall thickness. Normal systolic function. Estimated EF 65-70%. No regional wall motion abnormalities. Normal diastolic filling pattern for age.  •  Right Ventricle: Normal ventricle size. Normal systolic function.  •  Left Atrium: Normal sized atrium.  •  Right Atrium: Normal sized atrium.  •  Aortic Valve: Tricuspid valve. Trace regurgitation. No stenosis.  •  Mitral Valve: Normal leaflet structure. Normal leaflet motion. Trace regurgitation. No stenosis.  •  Tricuspid Valve: Normal structure. Jet is insufficient to calculate pulmonary artery pressure. Trace regurgitation.  •  Aortic root and ascending aorta are normal in caliber.         Exercise nuclear stress test.  6/5/2023.  1.  Exercise tolerance was fair.  2.  There were no exercise-induced arrhythmias.  3.  Appropriate heart rate and blood pressure response to exercise.  4.  The EKG does not meet diagnostic criteria for exercise induced ischemia.  5.  Probably normal nuclear perfusion scan.  There is a small apical defect which is fixed and most consistent with breast attenuation artifact.  No ischemia seen.  6.  Gated images: Normal wall motion.  The left ventricular ejection fraction is 67%.  No prior stress test for comparison.       Assessment/Plan    Shortness of breath  I do not have a definitive cardiac etiology for Michelle's dyspnea.  I suspect there is multifactorial but most likely related to her smoking history.    I have again counseled her on the need for smoking cessation.  I discussed with her a structured exercise program as well as diet and weight loss.    Mixed hyperlipidemia  Continue rosuvastatin 20 mg daily.  Michelle had an excellent response to the Crestor.  Her  LDL in March was 175 and yesterday her blood work revealed an LDL of 91.    She will continue on the rosuvastatin and fish oil.  I have counseled her on the continued need for diet and exercise.  I have given her information on the Mediterranean diet.    Benign essential hypertension  Blood pressure is controlled.  Michelle will continue on the losartan 25 mg daily.  This can be uptitrated as needed for control of her blood pressure.    Michelle will be coming due for her day-to-day care.  I have asked her to return to see me on an as-needed basis.  She will call with any questions or concerns in the interim.           Thank you for allowing me to participate in the care of this patient.  If you have any questions please don't hesitate to contact me.    I spent 30 minutes on this date of service performing the following activities: obtaining history, performing examination, entering orders, documenting, preparing for visit, obtaining / reviewing records, providing counseling and education and communicating results.     Declan Jackson MD [facility]   6/15/2023  3:19 PM

## 2023-06-15 NOTE — ASSESSMENT & PLAN NOTE
I do not have a definitive cardiac etiology for Michelle's dyspnea.  I suspect there is multifactorial but most likely related to her smoking history.    I have again counseled her on the need for smoking cessation.  I discussed with her a structured exercise program as well as diet and weight loss.

## 2023-06-15 NOTE — PATIENT INSTRUCTIONS
Patient Education   Mediterranean Diet  A Mediterranean diet refers to food and lifestyle choices that are based on the traditions of countries located on the [place]. This way of eating has been shown to help prevent certain conditions and improve outcomes forpeople who have chronic diseases, like kidney disease and heart disease.  What are tips for following this plan?  Lifestyle  Cook and eat meals together with your family, when possible.  Drink enough fluid to keep your urine clear or pale yellow.  Be physically active every day. This includes:  Aerobic exercise like running or swimming.  Leisure activities like gardening, walking, or housework.  Get 7-8 hours of sleep each night.  If recommended by your health care provider, drink red wine in moderation. This means 1 glass a day for nonpregnant women and 2 glasses a day for men. A glass of wine equals 5 oz (150 mL).  Reading food labels    Check the serving size of packaged foods. For foods such as rice and pasta, the serving size refers to the amount of cooked product, not dry.  Check the total fat in packaged foods. Avoid foods that have saturated fat or trans fats.  Check the ingredients list for added sugars, such as corn syrup.    Shopping  At the grocery store, buy most of your food from the areas near the walls of the store. This includes:  Fresh fruits and vegetables (produce).  Grains, beans, nuts, and seeds. Some of these may be available in unpackaged forms or large amounts (in bulk).  Fresh seafood.  Poultry and eggs.  Low-fat dairy products.  Buy whole ingredients instead of prepackaged foods.  Buy fresh fruits and vegetables in-season from local farmers markets.  Buy frozen fruits and vegetables in resealable bags.  If you do not have access to quality fresh seafood, buy precooked frozen shrimp or canned fish, such as tuna, salmon, or sardines.  Buy small amounts of raw or cooked vegetables, salads, or olives from the deli or salad bar  at your store.  Stock your pantry so you always have certain foods on hand, such as olive oil, canned tuna, canned tomatoes, rice, pasta, and beans.  Cooking  Cook foods with extra-virgin olive oil instead of using butter or other vegetable oils.  Have meat as a side dish, and have vegetables or grains as your main dish. This means having meat in small portions or adding small amounts of meat to foods like pasta or stew.  Use beans or vegetables instead of meat in common dishes like chili or lasagna.  Bells with different cooking methods. Try roasting or broiling vegetables instead of steaming or sautéeing them.  Add frozen vegetables to soups, stews, pasta, or rice.  Add nuts or seeds for added healthy fat at each meal. You can add these to yogurt, salads, or vegetable dishes.  Marinate fish or vegetables using olive oil, lemon juice, garlic, and fresh herbs.  Meal planning    Plan to eat 1 vegetarian meal one day each week. Try to work up to 2 vegetarian meals, if possible.  Eat seafood 2 or more times a week.  Have healthy snacks readily available, such as:  Vegetable sticks with hummus.  Greek yogurt.  Fruit and nut trail mix.  Eat balanced meals throughout the week. This includes:  Fruit: 2-3 servings a day  Vegetables: 4-5 servings a day  Low-fat dairy: 2 servings a day  Fish, poultry, or lean meat: 1 serving a day  Beans and legumes: 2 or more servings a week  Nuts and seeds: 1-2 servings a day  Whole grains: 6-8 servings a day  Extra-virgin olive oil: 3-4 servings a day  Limit red meat and sweets to only a few servings a month    What are my food choices?  Mediterranean diet  Recommended  Grains: Whole-grain pasta. Brown rice. Bulgar wheat. Polenta. Couscous. Whole-wheat bread. Oatmeal. Quinoa.  Vegetables: Artichokes. Beets. Broccoli. Cabbage. Carrots. Eggplant. Green beans. Chard. Kale. Spinach. Onions. Leeks. Peas. Squash. Tomatoes. Peppers. Radishes.  Fruits: Apples. Apricots. Avocado. Berries.  Bananas. Cherries. Dates. Figs. Grapes. Breanna. Melon. Oranges. Peaches. Plums. Pomegranate.  Meats and other protein foods: Beans. Almonds. Sunflower seeds. Pine nuts. Peanuts. Cod. [place]. Scallops. Shrimp. Tuna. Tilapia. Clams. Oysters. Eggs.  Dairy: Low-fat milk. Cheese. Greek yogurt.  Beverages: Water. Red wine. Herbal tea.  Fats and oils: Extra virgin olive oil. Avocado oil. Grape seed oil.  Sweets and desserts: Greek yogurt with honey. Baked apples. Poached pears. Trail mix.  Seasoning and other foods: Basil. Cilantro. Coriander. Cumin. Mint. Parsley. Chris. Rosemary. Tarragon. Garlic. Oregano. Thyme. Pepper. Balsalmic vinegar. Tahini. Hummus. Tomato sauce. Olives. Mushrooms.  Limit these  Grains: Prepackaged pasta or rice dishes. Prepackaged cereal with added sugar.  Vegetables: Deep fried potatoes (french fries).  Fruits: Fruit canned in syrup.  Meats and other protein foods: Beef. Pork. Lamb. Poultry with skin. Hot dogs. Patricia.  Dairy: Ice cream. Sour cream. Whole milk.  Beverages: Juice. Sugar-sweetened soft drinks. Beer. Liquor and spirits.  Fats and oils: Butter. Canola oil. Vegetable oil. Beef fat (tallow). Lard.  Sweets and desserts: Cookies. Cakes. Pies. Candy.  Seasoning and other foods: Mayonnaise. Premade sauces and marinades.  The items listed may not be a complete list. Talk with your dietitian aboutwhat dietary choices are right for you.  Summary  The Mediterranean diet includes both food and lifestyle choices.  Eat a variety of fresh fruits and vegetables, beans, nuts, seeds, and whole grains.  Limit the amount of red meat and sweets that you eat.  Talk with your health care provider about whether it is safe for you to drink red wine in moderation. This means 1 glass a day for nonpregnant women and 2 glasses a day for men. A glass of wine equals 5 oz (150 mL).  This information is not intended to replace advice given to you by your health care provider. Make sure you discuss any questions you  have with your healthcare provider.  Document Revised: 08/17/2017 Document Reviewed: 08/10/2017  Elsevier Patient Education © 2020 [employer]

## 2024-03-20 ENCOUNTER — TELEPHONE (OUTPATIENT)
Dept: SCHEDULING | Facility: CLINIC | Age: 69
End: 2024-03-20
Payer: COMMERCIAL

## 2024-03-20 DIAGNOSIS — E78.2 MIXED HYPERLIPIDEMIA: Primary | ICD-10-CM

## 2024-03-20 NOTE — TELEPHONE ENCOUNTER
Dr Johnson patient.    Patient has been having muscle pain for a long time now.     She  stopped her rosuvastatin 4 days ago but is now reading that this medication should not be stopped.      Please call her at [phone] to discuss. ty

## 2024-03-20 NOTE — TELEPHONE ENCOUNTER
Please call Michelle and tell her she can stop the medication and see how she feels.  If she feels better, we can prescribe an alternative therapy for her.

## 2024-03-20 NOTE — TELEPHONE ENCOUNTER
Called and spoke with patient and made her aware that Dr. Johnson is ok with patient stopping medication to see how she feels.  Told patient to stay off of her Rosuvastatin for two weeks and then let us know how she feels.  Patient verbally understands and has no further questions at this time.

## 2024-04-03 RX ORDER — ATORVASTATIN CALCIUM 20 MG/1
20 TABLET, FILM COATED ORAL DAILY
Qty: 90 TABLET | Refills: 1 | Status: SHIPPED | OUTPATIENT
Start: 2024-04-03 | End: 2024-06-25 | Stop reason: SDUPTHER

## 2024-04-03 NOTE — TELEPHONE ENCOUNTER
Please see above message.  Please let me know what alterative medication you would like patient to try.

## 2024-04-03 NOTE — TELEPHONE ENCOUNTER
Follow up phone call.  Called and spoke with patient, who stated that she is feeling much better, since stopping Rosuvastatin, she does not have an muscle pains anymore.  Told patient that I would make Dr. Johnson aware and would call her back with an alterative medication.

## 2024-04-03 NOTE — TELEPHONE ENCOUNTER
Called and spoke with patient and made her aware that Dr. Johnson would like for her to try Atorvastatin 20 mg daily.  Told patient that she may have the same/similar symptoms, told patient to call us if this happens.  Told patient that a script was sent to [facility].  Also told patient that a script was sent to [facility] for her to get repeat Lipid Panel in 3 months (in July).  Patient verbally understands and has no further questions at this time.

## 2024-04-03 NOTE — TELEPHONE ENCOUNTER
We will try her on atorvastatin 20 mg daily.  She should get a repeat lipid profile in 3 months.  Also let her know that it is possible she may have the same symptoms if she does to contact the office.

## 2024-06-24 ENCOUNTER — TELEPHONE (OUTPATIENT)
Dept: SCHEDULING | Facility: CLINIC | Age: 69
End: 2024-06-24
Payer: COMMERCIAL

## 2024-06-24 DIAGNOSIS — I10 BENIGN ESSENTIAL HYPERTENSION: Primary | ICD-10-CM

## 2024-06-24 DIAGNOSIS — E78.2 MIXED HYPERLIPIDEMIA: ICD-10-CM

## 2024-06-25 RX ORDER — ATORVASTATIN CALCIUM 20 MG/1
20 TABLET, FILM COATED ORAL DAILY
Qty: 90 TABLET | Refills: 1 | Status: SHIPPED
Start: 2024-06-25 | End: 2024-12-26 | Stop reason: SDUPTHER

## 2024-06-25 RX ORDER — LOSARTAN POTASSIUM 25 MG/1
25 TABLET ORAL DAILY
Qty: 90 TABLET | Refills: 1 | Status: SHIPPED | OUTPATIENT
Start: 2024-06-25 | End: 2024-07-23 | Stop reason: SDUPTHER

## 2024-06-25 RX ORDER — ATORVASTATIN CALCIUM 20 MG/1
20 TABLET, FILM COATED ORAL DAILY
Qty: 90 TABLET | Refills: 1 | OUTPATIENT
Start: 2024-06-25 | End: 2024-12-22

## 2024-06-25 NOTE — TELEPHONE ENCOUNTER
Raymon Martinez, that if she is having no heart issues she could call her PCP and get them to refill her Atorvastatin. Thanks

## 2024-06-25 NOTE — TELEPHONE ENCOUNTER
Called and spoke with patient.  Patient has a new PCP appointment in August.  Patient needs a refill with her Atorvastatin and Losartan, her former PCP will not order for patient.  Patient will call back once back from vacation to schedule appointment.  Sent script for Atorvastatin and Losartan to [facility].

## 2024-06-25 NOTE — TELEPHONE ENCOUNTER
Pt states that her PCP is retiring June 30th. Pt does need a refill, but they will not refill until they see her. Please advise. Pt can be reached at [phone]

## 2024-06-25 NOTE — TELEPHONE ENCOUNTER
DR VYAS   pt-- requests refill ATORVASTATIN    Last OV was  6/15/2023  no appts scheduled    Notified Pharmacy to advise PT to call our office for an office visit for evaluation and any refills    Not escribed.    Please advise of any changes.    Thank you    PSR pool--please reach out to PT to make an appointment for evaluation and refills--Thank you

## 2024-07-22 SDOH — ECONOMIC STABILITY: INCOME INSECURITY: IN THE LAST 12 MONTHS, WAS THERE A TIME WHEN YOU WERE NOT ABLE TO PAY THE MORTGAGE OR RENT ON TIME?: NO

## 2024-07-22 SDOH — ECONOMIC STABILITY: FOOD INSECURITY: WITHIN THE PAST 12 MONTHS, YOU WORRIED THAT YOUR FOOD WOULD RUN OUT BEFORE YOU GOT MONEY TO BUY MORE.: NEVER TRUE

## 2024-07-22 SDOH — ECONOMIC STABILITY: FOOD INSECURITY: WITHIN THE PAST 12 MONTHS, THE FOOD YOU BOUGHT JUST DIDN'T LAST AND YOU DIDN'T HAVE MONEY TO GET MORE.: NEVER TRUE

## 2024-07-22 SDOH — ECONOMIC STABILITY: TRANSPORTATION INSECURITY
IN THE PAST 12 MONTHS, HAS LACK OF TRANSPORTATION KEPT YOU FROM MEETINGS, WORK, OR FROM GETTING THINGS NEEDED FOR DAILY LIVING?: NO

## 2024-07-22 SDOH — ECONOMIC STABILITY: TRANSPORTATION INSECURITY
IN THE PAST 12 MONTHS, HAS THE LACK OF TRANSPORTATION KEPT YOU FROM MEDICAL APPOINTMENTS OR FROM GETTING MEDICATIONS?: NO

## 2024-07-22 ASSESSMENT — SOCIAL DETERMINANTS OF HEALTH (SDOH): IN THE PAST 12 MONTHS, HAS THE ELECTRIC, GAS, OIL, OR WATER COMPANY THREATENED TO SHUT OFF SERVICE IN YOUR HOME?: NO

## 2024-07-23 ENCOUNTER — OFFICE VISIT (OUTPATIENT)
Dept: PRIMARY CARE | Facility: CLINIC | Age: 69
End: 2024-07-23
Payer: COMMERCIAL

## 2024-07-23 VITALS
DIASTOLIC BLOOD PRESSURE: 100 MMHG | OXYGEN SATURATION: 98 % | BODY MASS INDEX: 27.28 KG/M2 | SYSTOLIC BLOOD PRESSURE: 160 MMHG | HEART RATE: 90 BPM | HEIGHT: 68 IN | TEMPERATURE: 97.8 F | WEIGHT: 180 LBS | RESPIRATION RATE: 16 BRPM

## 2024-07-23 DIAGNOSIS — Z12.11 COLON CANCER SCREENING: ICD-10-CM

## 2024-07-23 DIAGNOSIS — Z00.00 ROUTINE GENERAL MEDICAL EXAMINATION AT A HEALTH CARE FACILITY: ICD-10-CM

## 2024-07-23 DIAGNOSIS — E78.2 MIXED HYPERLIPIDEMIA: ICD-10-CM

## 2024-07-23 DIAGNOSIS — I10 BENIGN ESSENTIAL HYPERTENSION: Primary | ICD-10-CM

## 2024-07-23 DIAGNOSIS — Z13.820 ENCOUNTER FOR OSTEOPOROSIS SCREENING IN ASYMPTOMATIC POSTMENOPAUSAL PATIENT: ICD-10-CM

## 2024-07-23 DIAGNOSIS — Z78.0 ENCOUNTER FOR OSTEOPOROSIS SCREENING IN ASYMPTOMATIC POSTMENOPAUSAL PATIENT: ICD-10-CM

## 2024-07-23 DIAGNOSIS — R53.1 GENERAL WEAKNESS: ICD-10-CM

## 2024-07-23 PROCEDURE — 3077F SYST BP >= 140 MM HG: CPT | Performed by: STUDENT IN AN ORGANIZED HEALTH CARE EDUCATION/TRAINING PROGRAM

## 2024-07-23 PROCEDURE — 3008F BODY MASS INDEX DOCD: CPT | Performed by: STUDENT IN AN ORGANIZED HEALTH CARE EDUCATION/TRAINING PROGRAM

## 2024-07-23 PROCEDURE — 99203 OFFICE O/P NEW LOW 30 MIN: CPT | Performed by: STUDENT IN AN ORGANIZED HEALTH CARE EDUCATION/TRAINING PROGRAM

## 2024-07-23 PROCEDURE — 3080F DIAST BP >= 90 MM HG: CPT | Performed by: STUDENT IN AN ORGANIZED HEALTH CARE EDUCATION/TRAINING PROGRAM

## 2024-07-23 RX ORDER — LOSARTAN POTASSIUM 50 MG/1
50 TABLET ORAL DAILY
Qty: 90 TABLET | Refills: 0 | Status: SHIPPED | OUTPATIENT
Start: 2024-07-23 | End: 2024-09-26

## 2024-07-23 ASSESSMENT — PATIENT HEALTH QUESTIONNAIRE - PHQ9: SUM OF ALL RESPONSES TO PHQ9 QUESTIONS 1 & 2: 0

## 2024-07-23 ASSESSMENT — PAIN SCALES - GENERAL: PAINLEVEL: 0-NO PAIN

## 2024-07-23 NOTE — PROGRESS NOTES
"     [facility]  [facility]     CHIEF COMPLAINT   New Patient  Previous PCP: Dr. Drake, last seen 5/2023     HISTORY OF PRESENT ILLNESS      This is a 69 y.o. female who presents to \[facility]\"" care.  Today, admits to a vague multiple month history of a constellation of symptoms including fatigue, \"shuffling\" when walking, generalized heavy feeling, dyspnea on exertion, and generalized malaise.  She denies any focal neurologic deficits.    PAST MEDICAL AND SURGICAL HISTORY      Past Medical History:   Diagnosis Date    Benign essential hypertension 05/08/2023    Bursitis of hip, right 04/19/2023    Degeneration of cervical intervertebral disc     Malignant neoplasm of breast (CMS/HCC)     Mixed hyperlipidemia 05/08/2023    Nicotine dependence     Shortness of breath     Spondylosis        Past Surgical History:   Procedure Laterality Date    BREAST LUMPECTOMY Left        MEDICATIONS        Current Outpatient Medications:     ascorbic acid (VITAMIN C) 500 mg tablet, Take 500 mg by mouth daily., Disp: , Rfl:     atorvastatin (LIPITOR) 20 mg tablet, Take 1 tablet (20 mg total) by mouth daily., Disp: 90 tablet, Rfl: 1    calcium carbonate/vitamin D3 (CALTRATE 600 PLUS D ORAL), Take 1 tablet by mouth 2 (two) times a day., Disp: , Rfl:     cholecalciferol, vitamin D3, 1,000 unit (25 mcg) tablet, Take 1,000 Units by mouth daily., Disp: , Rfl:     losartan (COZAAR) 50 mg tablet, Take 1 tablet (50 mg total) by mouth daily., Disp: 90 tablet, Rfl: 0    multivitamin tablet, Take 1 tablet by mouth daily., Disp: , Rfl:     multivitamin with minerals (HAIR,SKIN AND NAILS) tablet, Take 1 tablet by mouth daily., Disp: , Rfl:     omega-3 fatty acids (FISH OIL CONCENTRATE ORAL), Take 1 capsule by mouth daily., Disp: , Rfl:     ALLERGIES      Patient has no known allergies.    FAMILY HISTORY      Family History   Problem Relation Age of Onset    Breast cancer Biological Mother     Heart disease Biological Father     Valvular " "heart disease Biological Father     Diabetes Other        SOCIAL HISTORY      Social History     Socioeconomic History    Marital status: Single     Spouse name: None    Number of children: None    Years of education: None    Highest education level: None   Tobacco Use    Smoking status: Former     Packs/day: 0.30     Years: 40.00     Additional pack years: 0.00     Total pack years: 12.00     Types: Cigarettes     Quit date: 2024     Years since quittin.5    Smokeless tobacco: Never   Vaping Use    Vaping Use: Never used   Substance and Sexual Activity    Alcohol use: Yes     Comment: rarely    Drug use: Never    Sexual activity: Not Currently     Partners: Male     Social Determinants of Health     Food Insecurity: No Food Insecurity (2024)    Hunger Vital Sign     Worried About Running Out of Food in the Last Year: Never true     Ran Out of Food in the Last Year: Never true   Transportation Needs: No Transportation Needs (2024)    PRAPARE - Transportation     Lack of Transportation (Medical): No     Lack of Transportation (Non-Medical): No   Housing Stability: Unknown (2024)    Housing Stability Vital Sign     Unable to Pay for Housing in the Last Year: No     Unstable Housing in the Last Year: No       REVIEW OF SYSTEMS      Review of Systems   All other systems reviewed and are negative.      PHYSICAL EXAMINATION      Visit Vitals  BP (!) 160/100   Pulse 90   Temp 36.6 °C (97.8 °F) (Temporal)   Resp 16   Ht 1.715 m (5' 7.5\")   Wt 81.6 kg (180 lb) Comment: no shoes   SpO2 98%   BMI 27.78 kg/m²     Body mass index is 27.78 kg/m².    Physical Exam  Vitals reviewed.   Constitutional:       General: She is not in acute distress.     Appearance: She is well-developed. She is not toxic-appearing or diaphoretic.   HENT:      Head: Normocephalic and atraumatic.   Eyes:      Extraocular Movements: Extraocular movements intact.      Pupils: Pupils are equal, round, and reactive to light. " "  Cardiovascular:      Rate and Rhythm: Normal rate and regular rhythm.      Heart sounds: Normal heart sounds. No murmur heard.     No friction rub. No gallop.   Pulmonary:      Effort: Pulmonary effort is normal. No respiratory distress.      Breath sounds: Normal breath sounds. No wheezing or rales.   Musculoskeletal:         General: No deformity. Normal range of motion.      Cervical back: Normal range of motion and neck supple.      Right lower leg: No edema.      Left lower leg: No edema.   Skin:     General: Skin is warm and dry.      Coloration: Skin is not jaundiced.      Findings: No rash.   Neurological:      General: No focal deficit present.      Mental Status: She is alert and oriented to person, place, and time.   Psychiatric:         Mood and Affect: Mood normal.         Behavior: Behavior normal.         LABS / IMAGING / STUDIES        Labs    Lab Results   Component Value Date    CREATININE 0.72 06/14/2023     No results found for: \"WBC\", \"HGB\", \"HCT\", \"MCV\", \"PLT\"      No results found for: \"HGBA1C\"  No results found for: \"MICROALBUR\", \"[id number]\"        HEALTH MAINTENANCE           Mammogram: UTD, 2/2024  Colonoscopy: Cologard distant, referred  DEXA scan: Due, ordered    PNA: Will check records  Shingrix: UTD  RSV: UTD    Labs: Due, ordered    Dentist: UTD    Diet: Int control  Exercise: Regular stationary biking       ASSESSMENT AND PLAN   Problem List Items Addressed This Visit          Circulatory    Benign essential hypertension - Primary    Relevant Medications    losartan (COZAAR) 50 mg tablet    Other Relevant Orders    CBC and differential    Comprehensive metabolic panel    Lipid panel    Hemoglobin A1c    TSH w reflex FT4    Microalbumin / creatinine urine ratio       Other    Mixed hyperlipidemia    Relevant Orders    CBC and differential    Comprehensive metabolic panel    Lipid panel    Hemoglobin A1c    TSH w reflex FT4    Microalbumin / creatinine urine ratio     Other Visit " Diagnoses       Colon cancer screening        Relevant Orders    Ambulatory referral to Gastroenterology    Encounter for osteoporosis screening in asymptomatic postmenopausal patient        Relevant Orders    DEXA BONE DENSITY    Routine general medical examination at a health care facility        Relevant Orders    CBC and differential    Comprehensive metabolic panel    Lipid panel    Hemoglobin A1c    TSH w reflex FT4    Microalbumin / creatinine urine ratio    General weakness                Health Care Maintenance (discussed if applicable):    Patient encouraged to increase activity gradually as tolerated with a goal of 150 minutes of aerobic activity per week via AVS.     Counseled patient on healthy eating (high quality, low carb, low sugar diet) via AVS.     Advised patient to complete regular dental examinations, brushing and flossing daily via AVS.    Encouraged enhanced safety by wearing seat belts, checking smoke and CO detectors via AVS.            Aroldo Ewing, DO  07/23/24        Some or all of this note has been written using voice recognition software.    Please excuse any typographical errors.    On date of encounter, 30 minutes were spent exclusively dedicated to this encounter including pre-visit chart review and documentation, patient interview and physical examination, and post-visit documentation.

## 2024-07-25 LAB
ALBUMIN SERPL-MCNC: 4.6 G/DL (ref 3.9–4.9)
ALBUMIN/CREAT UR: 56 MG/G CREAT (ref 0–29)
ALP SERPL-CCNC: 84 IU/L (ref 44–121)
ALT SERPL-CCNC: 35 IU/L (ref 0–32)
AST SERPL-CCNC: 34 IU/L (ref 0–40)
BASOPHILS # BLD AUTO: 0.1 X10E3/UL (ref 0–0.2)
BASOPHILS NFR BLD AUTO: 1 %
BILIRUB SERPL-MCNC: 0.5 MG/DL (ref 0–1.2)
BUN SERPL-MCNC: 15 MG/DL (ref 8–27)
BUN/CREAT SERPL: 22 (ref 12–28)
CALCIUM SERPL-MCNC: 9.6 MG/DL (ref 8.7–10.3)
CHLORIDE SERPL-SCNC: 94 MMOL/L (ref 96–106)
CHOLEST SERPL-MCNC: 217 MG/DL (ref 100–199)
CO2 SERPL-SCNC: 26 MMOL/L (ref 20–29)
CREAT SERPL-MCNC: 0.69 MG/DL (ref 0.57–1)
CREAT UR-MCNC: 61.5 MG/DL
EGFRCR SERPLBLD CKD-EPI 2021: 95 ML/MIN/1.73
EOSINOPHIL # BLD AUTO: 0.2 X10E3/UL (ref 0–0.4)
EOSINOPHIL NFR BLD AUTO: 2 %
ERYTHROCYTE [DISTWIDTH] IN BLOOD BY AUTOMATED COUNT: 12.1 % (ref 11.7–15.4)
GLOBULIN SER CALC-MCNC: 2.4 G/DL (ref 1.5–4.5)
GLUCOSE SERPL-MCNC: 106 MG/DL (ref 70–99)
HBA1C MFR BLD: 5.7 % (ref 4.8–5.6)
HCT VFR BLD AUTO: 47.1 % (ref 34–46.6)
HDLC SERPL-MCNC: 87 MG/DL
HGB BLD-MCNC: 15.7 G/DL (ref 11.1–15.9)
IMM GRANULOCYTES # BLD AUTO: 0 X10E3/UL (ref 0–0.1)
IMM GRANULOCYTES NFR BLD AUTO: 0 %
LDLC SERPL CALC-MCNC: 100 MG/DL (ref 0–99)
LYMPHOCYTES # BLD AUTO: 2.3 X10E3/UL (ref 0.7–3.1)
LYMPHOCYTES NFR BLD AUTO: 32 %
MCH RBC QN AUTO: 33.1 PG (ref 26.6–33)
MCHC RBC AUTO-ENTMCNC: 33.3 G/DL (ref 31.5–35.7)
MCV RBC AUTO: 99 FL (ref 79–97)
MICROALBUMIN UR-MCNC: 34.6 UG/ML
MONOCYTES # BLD AUTO: 0.6 X10E3/UL (ref 0.1–0.9)
MONOCYTES NFR BLD AUTO: 8 %
NEUTROPHILS # BLD AUTO: 4.1 X10E3/UL (ref 1.4–7)
NEUTROPHILS NFR BLD AUTO: 57 %
PLATELET # BLD AUTO: 235 X10E3/UL (ref 150–450)
POTASSIUM SERPL-SCNC: 4.9 MMOL/L (ref 3.5–5.2)
PROT SERPL-MCNC: 7 G/DL (ref 6–8.5)
RBC # BLD AUTO: 4.75 X10E6/UL (ref 3.77–5.28)
SODIUM SERPL-SCNC: 136 MMOL/L (ref 134–144)
T4 FREE SERPL-MCNC: 1.07 NG/DL (ref 0.82–1.77)
TRIGL SERPL-MCNC: 180 MG/DL (ref 0–149)
TSH SERPL DL<=0.005 MIU/L-ACNC: 3.3 UIU/ML (ref 0.45–4.5)
VLDLC SERPL CALC-MCNC: 30 MG/DL (ref 5–40)
WBC # BLD AUTO: 7.2 X10E3/UL (ref 3.4–10.8)

## 2024-09-11 ENCOUNTER — APPOINTMENT (RX ONLY)
Dept: URBAN - METROPOLITAN AREA CLINIC 28 | Facility: CLINIC | Age: 69
Setting detail: DERMATOLOGY
End: 2024-09-11

## 2024-09-11 DIAGNOSIS — L82.1 OTHER SEBORRHEIC KERATOSIS: ICD-10-CM

## 2024-09-11 DIAGNOSIS — D22 MELANOCYTIC NEVI: ICD-10-CM

## 2024-09-11 PROBLEM — D22.62 MELANOCYTIC NEVI OF LEFT UPPER LIMB, INCLUDING SHOULDER: Status: ACTIVE | Noted: 2024-09-11

## 2024-09-11 PROCEDURE — ? COUNSELING

## 2024-09-11 PROCEDURE — 99203 OFFICE O/P NEW LOW 30 MIN: CPT

## 2024-09-11 ASSESSMENT — LOCATION DETAILED DESCRIPTION DERM
LOCATION DETAILED: RIGHT DISTAL POSTERIOR UPPER ARM
LOCATION DETAILED: LEFT ANTERIOR LATERAL PROXIMAL UPPER ARM

## 2024-09-11 ASSESSMENT — LOCATION ZONE DERM: LOCATION ZONE: ARM

## 2024-09-11 ASSESSMENT — LOCATION SIMPLE DESCRIPTION DERM
LOCATION SIMPLE: RIGHT POSTERIOR UPPER ARM
LOCATION SIMPLE: LEFT UPPER ARM

## 2024-09-25 ENCOUNTER — TELEPHONE (OUTPATIENT)
Dept: PRIMARY CARE | Facility: CLINIC | Age: 69
End: 2024-09-25

## 2024-09-25 ENCOUNTER — OFFICE VISIT (OUTPATIENT)
Dept: PRIMARY CARE | Facility: CLINIC | Age: 69
End: 2024-09-25
Payer: COMMERCIAL

## 2024-09-25 VITALS
HEIGHT: 68 IN | WEIGHT: 182 LBS | HEART RATE: 86 BPM | TEMPERATURE: 97.6 F | BODY MASS INDEX: 27.58 KG/M2 | RESPIRATION RATE: 15 BRPM | OXYGEN SATURATION: 95 %

## 2024-09-25 DIAGNOSIS — T63.441A BEE STING, ACCIDENTAL OR UNINTENTIONAL, INITIAL ENCOUNTER: Primary | ICD-10-CM

## 2024-09-25 DIAGNOSIS — Z87.891 PERSONAL HISTORY OF TOBACCO USE, PRESENTING HAZARDS TO HEALTH: ICD-10-CM

## 2024-09-25 PROCEDURE — 3008F BODY MASS INDEX DOCD: CPT | Performed by: STUDENT IN AN ORGANIZED HEALTH CARE EDUCATION/TRAINING PROGRAM

## 2024-09-25 PROCEDURE — 99213 OFFICE O/P EST LOW 20 MIN: CPT | Performed by: STUDENT IN AN ORGANIZED HEALTH CARE EDUCATION/TRAINING PROGRAM

## 2024-09-25 RX ORDER — SULFAMETHOXAZOLE AND TRIMETHOPRIM 800; 160 MG/1; MG/1
1 TABLET ORAL 2 TIMES DAILY
Qty: 14 TABLET | Refills: 0 | Status: SHIPPED | OUTPATIENT
Start: 2024-09-25 | End: 2024-10-01

## 2024-09-25 RX ORDER — METHYLPREDNISOLONE 4 MG/1
TABLET ORAL
Qty: 21 TABLET | Refills: 0 | Status: SHIPPED | OUTPATIENT
Start: 2024-09-25 | End: 2024-10-01

## 2024-09-25 ASSESSMENT — PATIENT HEALTH QUESTIONNAIRE - PHQ9: SUM OF ALL RESPONSES TO PHQ9 QUESTIONS 1 & 2: 0

## 2024-09-25 ASSESSMENT — PAIN SCALES - GENERAL: PAINLEVEL_OUTOF10: 0-NO PAIN

## 2024-09-25 NOTE — TELEPHONE ENCOUNTER
Yesterday pt got stung by and Bee on her thumb left hand and she has cellulitis  and she is requesting an antibiotic

## 2024-09-25 NOTE — PROGRESS NOTES
[facility]     CHIEF COMPLAINT   Michelle Chatman is a 69 y.o. female who presents to the office for same-day sick visit.     HISTORY OF PRESENT ILLNESS      Patient presents for approximately 12-hour history of a bee sting.  She states she was stung on the dorsal surface of the left hand by a bee last night and upon waking this morning found the right thumb and thenar eminence to be swollen, erythematous, and hot to touch.  Denies systemic symptoms.  Does admit to a mild amount of dyspnea on exertion, however states that this is chronic for her and not since she has been stung by a bee.    PAST MEDICAL AND SURGICAL HISTORY      PMHx:  Patient Active Problem List    Diagnosis Date Noted    Benign essential hypertension 05/08/2023    Shortness of breath 05/08/2023    Mixed hyperlipidemia 05/08/2023     PSHx:  Past Surgical History   Procedure Laterality Date    Breast lumpectomy Left      MEDICATIONS        Current Outpatient Medications:     ascorbic acid (VITAMIN C) 500 mg tablet, Take 500 mg by mouth daily., Disp: , Rfl:     atorvastatin (LIPITOR) 20 mg tablet, Take 1 tablet (20 mg total) by mouth daily., Disp: 90 tablet, Rfl: 1    calcium carbonate/vitamin D3 (CALTRATE 600 PLUS D ORAL), Take 1 tablet by mouth 2 (two) times a day., Disp: , Rfl:     cholecalciferol, vitamin D3, 1,000 unit (25 mcg) tablet, Take 1,000 Units by mouth daily., Disp: , Rfl:     losartan (COZAAR) 50 mg tablet, Take 1 tablet (50 mg total) by mouth daily., Disp: 90 tablet, Rfl: 0    methylPREDNISolone (MEDROL DOSEPACK) 4 mg tablet, Follow package directions., Disp: 21 tablet, Rfl: 0    multivitamin tablet, Take 1 tablet by mouth daily., Disp: , Rfl:     multivitamin with minerals (HAIR,SKIN AND NAILS) tablet, Take 1 tablet by mouth daily., Disp: , Rfl:     omega-3 fatty acids (FISH OIL CONCENTRATE ORAL), Take 1 capsule by mouth daily., Disp: , Rfl:     sulfamethoxazole-trimethoprim (BACTRIM DS) 800-160 mg per tablet,  "Take 1 tablet by mouth 2 (two) times a day for 7 days., Disp: 14 tablet, Rfl: 0  ALLERGIES      Patient has no known allergies.  FAMILY HISTORY      Family History   Problem Relation Name Age of Onset    Breast cancer Biological Mother      Heart disease Biological Father      Valvular heart disease Biological Father      Diabetes Other       SOCIAL HISTORY      Social History     Socioeconomic History    Marital status: Single     Spouse name: None    Number of children: None    Years of education: None    Highest education level: None   Tobacco Use    Smoking status: Former     Current packs/day: 0.00     Average packs/day: 0.3 packs/day for 40.0 years (12.0 ttl pk-yrs)     Types: Cigarettes     Start date: 1984     Quit date: 2024     Years since quittin.7    Smokeless tobacco: Never   Vaping Use    Vaping status: Never Used   Substance and Sexual Activity    Alcohol use: Yes     Comment: rarely    Drug use: Never    Sexual activity: Not Currently     Partners: Male     Social Drivers of Health     Food Insecurity: No Food Insecurity (2024)    Hunger Vital Sign     Worried About Running Out of Food in the Last Year: Never true     Ran Out of Food in the Last Year: Never true   Transportation Needs: No Transportation Needs (2024)    PRAPARE - Transportation     Lack of Transportation (Medical): No     Lack of Transportation (Non-Medical): No   Housing Stability: Unknown (2024)    Housing Stability Vital Sign     Unable to Pay for Housing in the Last Year: No     Unstable Housing in the Last Year: No     REVIEW OF SYSTEMS      .Review of Systems   All other systems reviewed and are negative.    PHYSICAL EXAMINATION      Visit Vitals  Pulse 86   Temp 36.4 °C (97.6 °F) (Temporal)   Resp 15   Ht 1.715 m (5' 7.5\")   Wt 82.6 kg (182 lb) Comment: with sneakers on   SpO2 95%   BMI 28.08 kg/m²     Body mass index is 28.08 kg/m².    Physical Exam  Vitals reviewed.   Constitutional:       General: " She is not in acute distress.     Appearance: She is well-developed. She is not toxic-appearing or diaphoretic.   HENT:      Head: Normocephalic and atraumatic.   Eyes:      Extraocular Movements: Extraocular movements intact.      Pupils: Pupils are equal, round, and reactive to light.   Cardiovascular:      Rate and Rhythm: Normal rate and regular rhythm.      Heart sounds: Normal heart sounds. No murmur heard.     No friction rub. No gallop.   Pulmonary:      Effort: Pulmonary effort is normal. No respiratory distress.      Breath sounds: Normal breath sounds. No wheezing or rales.   Musculoskeletal:         General: No deformity. Normal range of motion.      Cervical back: Normal range of motion and neck supple.      Right lower leg: No edema.      Left lower leg: No edema.      Comments: Markedly erythematous and swollen left thumb compared to contralateral   Skin:     General: Skin is warm and dry.      Coloration: Skin is not jaundiced.      Findings: No rash.   Neurological:      General: No focal deficit present.      Mental Status: She is alert and oriented to person, place, and time.   Psychiatric:         Mood and Affect: Mood normal.         Behavior: Behavior normal.       LABS / IMAGING / STUDIES      Lab Results   Component Value Date    CREATININE 0.69 07/24/2024    CREATININE 0.72 06/14/2023     Lab Results   Component Value Date    WBC 7.2 07/24/2024    HGB 15.7 07/24/2024    HCT 47.1 (H) 07/24/2024    MCV 99 (H) 07/24/2024     07/24/2024     Lab Results   Component Value Date    CHOL 217 (H) 07/24/2024    CHOL 204 (H) 06/14/2023    TRIG 180 (H) 07/24/2024    TRIG 125 06/14/2023    HDL 87 07/24/2024    HDL 90 06/14/2023     Lab Results   Component Value Date    HGBA1C 5.7 (H) 07/24/2024     Lab Results   Component Value Date    MICROALBUR 34.6 07/24/2024           HEALTH MAINTENANCE   ...   ASSESSMENT AND PLAN   Problem List Items Addressed This Visit    None  Visit Diagnoses       Bee  sting, accidental or unintentional, initial encounter    -  Primary    Relevant Medications    sulfamethoxazole-trimethoprim (BACTRIM DS) 800-160 mg per tablet    methylPREDNISolone (MEDROL DOSEPACK) 4 mg tablet    Personal history of tobacco use, presenting hazards to health        Relevant Orders    CT LUNG SCREENING WITHOUT IV CONTRAST                 Aroldo Ewing,   9/25/2024        Some or all of this note has been written using voice recognition software.    Please excuse any typographical errors.    On date of encounter, 20 minutes were spent exclusively dedicated to this encounter including pre-visit chart review and documentation, patient interview and physical examination, and post-visit documentation.

## 2024-09-26 DIAGNOSIS — I10 BENIGN ESSENTIAL HYPERTENSION: ICD-10-CM

## 2024-09-26 RX ORDER — LOSARTAN POTASSIUM 50 MG/1
50 TABLET ORAL DAILY
Qty: 90 TABLET | Refills: 0 | Status: SHIPPED | OUTPATIENT
Start: 2024-09-26 | End: 2024-10-01 | Stop reason: SDUPTHER

## 2024-10-01 ENCOUNTER — OFFICE VISIT (OUTPATIENT)
Dept: PRIMARY CARE | Facility: CLINIC | Age: 69
End: 2024-10-01
Payer: COMMERCIAL

## 2024-10-01 VITALS
BODY MASS INDEX: 27.13 KG/M2 | OXYGEN SATURATION: 96 % | DIASTOLIC BLOOD PRESSURE: 88 MMHG | WEIGHT: 179 LBS | RESPIRATION RATE: 15 BRPM | HEART RATE: 77 BPM | HEIGHT: 68 IN | TEMPERATURE: 98 F | SYSTOLIC BLOOD PRESSURE: 142 MMHG

## 2024-10-01 DIAGNOSIS — J43.9 PULMONARY EMPHYSEMA, UNSPECIFIED EMPHYSEMA TYPE (CMS/HCC): Primary | ICD-10-CM

## 2024-10-01 DIAGNOSIS — I10 BENIGN ESSENTIAL HYPERTENSION: ICD-10-CM

## 2024-10-01 PROCEDURE — 3008F BODY MASS INDEX DOCD: CPT | Performed by: STUDENT IN AN ORGANIZED HEALTH CARE EDUCATION/TRAINING PROGRAM

## 2024-10-01 PROCEDURE — 99214 OFFICE O/P EST MOD 30 MIN: CPT | Performed by: STUDENT IN AN ORGANIZED HEALTH CARE EDUCATION/TRAINING PROGRAM

## 2024-10-01 PROCEDURE — 3079F DIAST BP 80-89 MM HG: CPT | Performed by: STUDENT IN AN ORGANIZED HEALTH CARE EDUCATION/TRAINING PROGRAM

## 2024-10-01 PROCEDURE — 3077F SYST BP >= 140 MM HG: CPT | Performed by: STUDENT IN AN ORGANIZED HEALTH CARE EDUCATION/TRAINING PROGRAM

## 2024-10-01 RX ORDER — LOSARTAN POTASSIUM 100 MG/1
100 TABLET ORAL DAILY
Qty: 90 TABLET | Refills: 1 | Status: SHIPPED | OUTPATIENT
Start: 2024-10-01 | End: 2025-03-17

## 2024-10-01 ASSESSMENT — PATIENT HEALTH QUESTIONNAIRE - PHQ9: SUM OF ALL RESPONSES TO PHQ9 QUESTIONS 1 & 2: 0

## 2024-10-01 ASSESSMENT — PAIN SCALES - GENERAL: PAINLEVEL_OUTOF10: 0-NO PAIN

## 2024-10-01 NOTE — PROGRESS NOTES
[facility]     CHIEF COMPLAINT   Michelle Chatman is a 69 y.o. female who presents to the office for follow-up.     HISTORY OF PRESENT ILLNESS      Continues to admit to ongoing dyspnea on exertion.  States previously noted bee sting is improved.  CT with mild emphysema.    PAST MEDICAL AND SURGICAL HISTORY      PMHx:  Patient Active Problem List    Diagnosis Date Noted    Pulmonary emphysema (CMS/HCC) 10/01/2024    Benign essential hypertension 05/08/2023    Shortness of breath 05/08/2023    Mixed hyperlipidemia 05/08/2023     PSHx:  Past Surgical History   Procedure Laterality Date    Breast lumpectomy Left      MEDICATIONS        Current Outpatient Medications:     ascorbic acid (VITAMIN C) 500 mg tablet, Take 500 mg by mouth daily., Disp: , Rfl:     atorvastatin (LIPITOR) 20 mg tablet, Take 1 tablet (20 mg total) by mouth daily., Disp: 90 tablet, Rfl: 1    calcium carbonate/vitamin D3 (CALTRATE 600 PLUS D ORAL), Take 1 tablet by mouth 2 (two) times a day., Disp: , Rfl:     cholecalciferol, vitamin D3, 1,000 unit (25 mcg) tablet, Take 1,000 Units by mouth daily., Disp: , Rfl:     losartan (COZAAR) 100 mg tablet, Take 1 tablet (100 mg total) by mouth daily., Disp: 90 tablet, Rfl: 1    multivitamin tablet, Take 1 tablet by mouth daily., Disp: , Rfl:     omega-3 fatty acids (FISH OIL CONCENTRATE ORAL), Take 1 capsule by mouth daily., Disp: , Rfl:     umeclidinium-vilanteroL (ANORO ELLIPTA) 62.5-25 mcg/actuation blister with device, Inhale 1 puff daily., Disp: 60 each, Rfl: 0  ALLERGIES      Patient has no known allergies.  FAMILY HISTORY      Family History   Problem Relation Name Age of Onset    Breast cancer Biological Mother      Heart disease Biological Father      Valvular heart disease Biological Father      Diabetes Other       SOCIAL HISTORY      Social History     Socioeconomic History    Marital status: Single     Spouse name: None    Number of children: None    Years of education:  "None    Highest education level: None   Tobacco Use    Smoking status: Former     Current packs/day: 0.00     Average packs/day: 0.3 packs/day for 40.0 years (10.0 ttl pk-yrs)     Types: Cigarettes     Start date: 1984     Quit date: 2024     Years since quittin.7    Smokeless tobacco: Never   Vaping Use    Vaping status: Never Used   Substance and Sexual Activity    Alcohol use: Yes     Comment: rarely    Drug use: Never    Sexual activity: Not Currently     Partners: Male     Social Drivers of Health     Food Insecurity: No Food Insecurity (2024)    Hunger Vital Sign     Worried About Running Out of Food in the Last Year: Never true     Ran Out of Food in the Last Year: Never true   Transportation Needs: No Transportation Needs (2024)    PRAPARE - Transportation     Lack of Transportation (Medical): No     Lack of Transportation (Non-Medical): No   Housing Stability: Unknown (2024)    Housing Stability Vital Sign     Unable to Pay for Housing in the Last Year: No     Unstable Housing in the Last Year: No     REVIEW OF SYSTEMS      .Review of Systems   All other systems reviewed and are negative.    PHYSICAL EXAMINATION      Visit Vitals  BP (!) 142/88   Pulse 77   Temp 36.7 °C (98 °F) (Temporal)   Resp 15   Ht 1.715 m (5' 7.5\")   Wt 81.2 kg (179 lb) Comment: no shoes on   SpO2 96%   BMI 27.62 kg/m²     Body mass index is 27.62 kg/m².    Physical Exam  Vitals reviewed.   Constitutional:       General: She is not in acute distress.     Appearance: She is well-developed. She is not toxic-appearing or diaphoretic.   HENT:      Head: Normocephalic and atraumatic.   Eyes:      Extraocular Movements: Extraocular movements intact.      Pupils: Pupils are equal, round, and reactive to light.   Cardiovascular:      Rate and Rhythm: Normal rate and regular rhythm.      Heart sounds: Normal heart sounds. No murmur heard.     No friction rub. No gallop.   Pulmonary:      Effort: Pulmonary effort is " normal. No respiratory distress.      Breath sounds: No wheezing or rales.      Comments: Dec bs throughout  Musculoskeletal:         General: No deformity. Normal range of motion.      Cervical back: Normal range of motion and neck supple.      Right lower leg: No edema.      Left lower leg: No edema.   Skin:     General: Skin is warm and dry.      Coloration: Skin is not jaundiced.      Findings: No rash.   Neurological:      General: No focal deficit present.      Mental Status: She is alert and oriented to person, place, and time.   Psychiatric:         Mood and Affect: Mood normal.         Behavior: Behavior normal.       LABS / IMAGING / STUDIES      Lab Results   Component Value Date    CREATININE 0.69 07/24/2024    CREATININE 0.72 06/14/2023     Lab Results   Component Value Date    WBC 7.2 07/24/2024    HGB 15.7 07/24/2024    HCT 47.1 (H) 07/24/2024    MCV 99 (H) 07/24/2024     07/24/2024     Lab Results   Component Value Date    CHOL 217 (H) 07/24/2024    CHOL 204 (H) 06/14/2023    TRIG 180 (H) 07/24/2024    TRIG 125 06/14/2023    HDL 87 07/24/2024    HDL 90 06/14/2023     Lab Results   Component Value Date    HGBA1C 5.7 (H) 07/24/2024     Lab Results   Component Value Date    MICROALBUR 34.6 07/24/2024           HEALTH MAINTENANCE   ...   ASSESSMENT AND PLAN   Problem List Items Addressed This Visit          Respiratory    Pulmonary emphysema (CMS/HCC) - Primary     Will initiate therapy with LABA/LAMA daily.         Relevant Medications    umeclidinium-vilanteroL (ANORO ELLIPTA) 62.5-25 mcg/actuation blister with device       Circulatory    Benign essential hypertension    Relevant Medications    losartan (COZAAR) 100 mg tablet            Aroldo Ewing DO  10/1/2024        Some or all of this note has been written using voice recognition software.    Please excuse any typographical errors.    On date of encounter, 30 minutes were spent exclusively dedicated to this encounter including pre-visit  chart review and documentation, patient interview and physical examination, and post-visit documentation.

## 2024-10-27 DIAGNOSIS — J43.9 PULMONARY EMPHYSEMA, UNSPECIFIED EMPHYSEMA TYPE (CMS/HCC): ICD-10-CM

## 2024-10-29 RX ORDER — UMECLIDINIUM BROMIDE AND VILANTEROL TRIFENATATE 62.5; 25 UG/1; UG/1
POWDER RESPIRATORY (INHALATION)
Qty: 60 EACH | Refills: 6 | Status: SHIPPED | OUTPATIENT
Start: 2024-10-29 | End: 2024-11-25 | Stop reason: SDUPTHER

## 2024-11-25 DIAGNOSIS — J43.9 PULMONARY EMPHYSEMA, UNSPECIFIED EMPHYSEMA TYPE (CMS/HCC): ICD-10-CM

## 2024-11-25 RX ORDER — UMECLIDINIUM BROMIDE AND VILANTEROL TRIFENATATE 62.5; 25 UG/1; UG/1
1 POWDER RESPIRATORY (INHALATION) DAILY
Qty: 60 EACH | Refills: 6 | Status: SHIPPED | OUTPATIENT
Start: 2024-11-25

## 2024-12-17 ENCOUNTER — APPOINTMENT (OUTPATIENT)
Dept: URBAN - METROPOLITAN AREA CLINIC 28 | Facility: CLINIC | Age: 69
Setting detail: DERMATOLOGY
End: 2024-12-17

## 2024-12-17 DIAGNOSIS — L81.4 OTHER MELANIN HYPERPIGMENTATION: ICD-10-CM | Status: INADEQUATELY CONTROLLED

## 2024-12-17 DIAGNOSIS — R60.0 LOCALIZED EDEMA: ICD-10-CM | Status: INADEQUATELY CONTROLLED

## 2024-12-17 PROCEDURE — ? COUNSELING

## 2024-12-17 PROCEDURE — ? PRESCRIPTION

## 2024-12-17 PROCEDURE — 99213 OFFICE O/P EST LOW 20 MIN: CPT

## 2024-12-17 PROCEDURE — ? ADDITIONAL NOTES

## 2024-12-17 PROCEDURE — ? PRESCRIPTION MEDICATION MANAGEMENT

## 2024-12-17 RX ORDER — HYDROQUINONE 40 MG/G
CREAM TOPICAL QHS
Qty: 28.35 | Refills: 2 | Status: ERX | COMMUNITY
Start: 2024-12-17

## 2024-12-17 RX ADMIN — HYDROQUINONE: 40 CREAM TOPICAL at 00:00

## 2024-12-17 ASSESSMENT — LOCATION DETAILED DESCRIPTION DERM
LOCATION DETAILED: RIGHT KNEE
LOCATION DETAILED: LEFT INFERIOR CENTRAL MALAR CHEEK

## 2024-12-17 ASSESSMENT — LOCATION ZONE DERM
LOCATION ZONE: LEG
LOCATION ZONE: FACE

## 2024-12-17 ASSESSMENT — LOCATION SIMPLE DESCRIPTION DERM
LOCATION SIMPLE: LEFT CHEEK
LOCATION SIMPLE: RIGHT KNEE

## 2024-12-17 NOTE — PROCEDURE: ADDITIONAL NOTES
Additional Notes: Discussed possible fluid retention around knee. Patient also notes increased joint pain in same knee with swelling. Recommended following with primary care doctor and rheumatology
Render Risk Assessment In Note?: yes
Detail Level: Simple

## 2024-12-17 NOTE — PROCEDURE: PRESCRIPTION MEDICATION MANAGEMENT
Initiate Treatment: hydroquinone 4 % topical cream QHS\\nQuantity: 28.35 g  Days Supply: 30\\nSig: Apply a thin layer to AA to dark marks BID X 3 months. Take 3 months off after use.
Render In Strict Bullet Format?: No
Detail Level: Zone

## 2024-12-18 ENCOUNTER — TELEPHONE (OUTPATIENT)
Dept: SCHEDULING | Facility: CLINIC | Age: 69
End: 2024-12-18

## 2024-12-18 DIAGNOSIS — E78.2 MIXED HYPERLIPIDEMIA: ICD-10-CM

## 2024-12-18 RX ORDER — ATORVASTATIN CALCIUM 20 MG/1
20 TABLET, FILM COATED ORAL DAILY
Qty: 90 TABLET | Refills: 3 | OUTPATIENT
Start: 2024-12-18

## 2024-12-18 NOTE — TELEPHONE ENCOUNTER
Raymon Martinez to call back and schedule an appointment with one of the nurse practioners so she can get her medication refilled or call her PCP which ever is easier for her.

## 2024-12-26 ENCOUNTER — TELEPHONE (OUTPATIENT)
Dept: SCHEDULING | Facility: CLINIC | Age: 69
End: 2024-12-26

## 2024-12-26 DIAGNOSIS — E78.2 MIXED HYPERLIPIDEMIA: ICD-10-CM

## 2024-12-26 RX ORDER — ATORVASTATIN CALCIUM 20 MG/1
20 TABLET, FILM COATED ORAL DAILY
Qty: 90 TABLET | Refills: 1 | Status: SHIPPED | OUTPATIENT
Start: 2024-12-26

## 2024-12-26 RX ORDER — ATORVASTATIN CALCIUM 20 MG/1
20 TABLET, FILM COATED ORAL DAILY
Qty: 90 TABLET | Refills: 1 | OUTPATIENT
Start: 2024-12-26

## 2024-12-26 NOTE — TELEPHONE ENCOUNTER
Unable to fill medication, due to pt has not been seen since 6/23 and has no upcoming appts scheduled.  Please reach out to pt to see if they would like to schedule an appointment with Dr. Johnson or get refill from PCP.  Thank you.

## 2025-01-08 ENCOUNTER — TELEPHONE (OUTPATIENT)
Dept: PRIMARY CARE | Facility: CLINIC | Age: 70
End: 2025-01-08
Payer: COMMERCIAL

## 2025-01-08 DIAGNOSIS — R92.8 ABNORMAL MAMMOGRAM: Primary | ICD-10-CM

## 2025-01-09 ENCOUNTER — TELEPHONE (OUTPATIENT)
Dept: PRIMARY CARE | Facility: CLINIC | Age: 70
End: 2025-01-09

## 2025-01-09 DIAGNOSIS — N63.20 MASS OF LEFT BREAST, UNSPECIFIED QUADRANT: Primary | ICD-10-CM

## 2025-01-09 NOTE — TELEPHONE ENCOUNTER
Department: [facility] PRIMARY CARE IN [facility]   Clinical Pool: [facility] PRIMARY CARE [facility] CLINICAL SUPPORT POOL   PSR Pool: [facility] PRIMARY CARE [facility] PSR POOL     Requesting script for mammo because she feels a lump in left breast, and script for a US. Pls reach out when ready [phone]

## 2025-01-22 ENCOUNTER — TELEPHONE (OUTPATIENT)
Dept: PRIMARY CARE | Facility: CLINIC | Age: 70
End: 2025-01-22
Payer: COMMERCIAL

## 2025-01-22 NOTE — TELEPHONE ENCOUNTER
Pt needs a diagnostic bilateral mammogram script   Faxed to [facility] :  [phone]

## 2025-02-07 ENCOUNTER — TELEPHONE (OUTPATIENT)
Dept: PRIMARY CARE | Facility: CLINIC | Age: 70
End: 2025-02-07
Payer: COMMERCIAL

## 2025-02-07 DIAGNOSIS — N63.20 MASS OF LEFT BREAST, UNSPECIFIED QUADRANT: Primary | ICD-10-CM

## 2025-02-07 DIAGNOSIS — Z12.31 ENCOUNTER FOR SCREENING MAMMOGRAM FOR MALIGNANT NEOPLASM OF BREAST: Primary | ICD-10-CM

## 2025-03-15 DIAGNOSIS — I10 BENIGN ESSENTIAL HYPERTENSION: ICD-10-CM

## 2025-03-17 RX ORDER — LOSARTAN POTASSIUM 100 MG/1
100 TABLET ORAL DAILY
Qty: 90 TABLET | Refills: 0 | Status: SHIPPED | OUTPATIENT
Start: 2025-03-17

## 2025-04-01 ENCOUNTER — OFFICE VISIT (OUTPATIENT)
Dept: PRIMARY CARE | Facility: CLINIC | Age: 70
End: 2025-04-01
Payer: COMMERCIAL

## 2025-04-01 VITALS
TEMPERATURE: 97 F | DIASTOLIC BLOOD PRESSURE: 86 MMHG | WEIGHT: 183.5 LBS | HEIGHT: 68 IN | BODY MASS INDEX: 27.81 KG/M2 | SYSTOLIC BLOOD PRESSURE: 132 MMHG | HEART RATE: 79 BPM | RESPIRATION RATE: 18 BRPM | OXYGEN SATURATION: 95 %

## 2025-04-01 DIAGNOSIS — E78.2 MIXED HYPERLIPIDEMIA: ICD-10-CM

## 2025-04-01 DIAGNOSIS — I10 BENIGN ESSENTIAL HYPERTENSION: ICD-10-CM

## 2025-04-01 DIAGNOSIS — Z00.00 ROUTINE GENERAL MEDICAL EXAMINATION AT A HEALTH CARE FACILITY: ICD-10-CM

## 2025-04-01 DIAGNOSIS — R91.1 PULMONARY NODULE: ICD-10-CM

## 2025-04-01 DIAGNOSIS — R53.82 CHRONIC FATIGUE: Primary | ICD-10-CM

## 2025-04-01 DIAGNOSIS — J43.9 PULMONARY EMPHYSEMA, UNSPECIFIED EMPHYSEMA TYPE (CMS/HCC): ICD-10-CM

## 2025-04-01 DIAGNOSIS — Z87.891 PERSONAL HISTORY OF TOBACCO USE, PRESENTING HAZARDS TO HEALTH: ICD-10-CM

## 2025-04-01 PROCEDURE — 3079F DIAST BP 80-89 MM HG: CPT | Performed by: STUDENT IN AN ORGANIZED HEALTH CARE EDUCATION/TRAINING PROGRAM

## 2025-04-01 PROCEDURE — 3075F SYST BP GE 130 - 139MM HG: CPT | Performed by: STUDENT IN AN ORGANIZED HEALTH CARE EDUCATION/TRAINING PROGRAM

## 2025-04-01 PROCEDURE — 3008F BODY MASS INDEX DOCD: CPT | Performed by: STUDENT IN AN ORGANIZED HEALTH CARE EDUCATION/TRAINING PROGRAM

## 2025-04-01 PROCEDURE — 99214 OFFICE O/P EST MOD 30 MIN: CPT | Performed by: STUDENT IN AN ORGANIZED HEALTH CARE EDUCATION/TRAINING PROGRAM

## 2025-04-01 NOTE — PROGRESS NOTES
[facility]     CHIEF COMPLAINT   Michelle Chatman is a 69 y.o. female who presents to the office for follow-up.     HISTORY OF PRESENT ILLNESS      Today, patient admits to chronic history of fatigue.  She states she sleeps approximately 10 to 11 hours a night but does not feel rested.  She does have a comorbid history of high blood pressure.  She denies snoring or witnessed apneic episodes.    PAST MEDICAL AND SURGICAL HISTORY      PMHx:  Patient Active Problem List    Diagnosis Date Noted    Chronic fatigue 04/01/2025    Pulmonary emphysema (CMS/HCC) 10/01/2024    Benign essential hypertension 05/08/2023    Shortness of breath 05/08/2023    Mixed hyperlipidemia 05/08/2023     PSHx:  Past Surgical History   Procedure Laterality Date    Breast lumpectomy Left      MEDICATIONS        Current Outpatient Medications:     ascorbic acid (VITAMIN C) 500 mg tablet, Take 500 mg by mouth daily., Disp: , Rfl:     atorvastatin (LIPITOR) 20 mg tablet, Take 1 tablet (20 mg total) by mouth daily., Disp: 90 tablet, Rfl: 1    calcium carbonate/vitamin D3 (CALTRATE 600 PLUS D ORAL), Take 1 tablet by mouth 2 (two) times a day., Disp: , Rfl:     cholecalciferol, vitamin D3, 1,000 unit (25 mcg) tablet, Take 1,000 Units by mouth daily., Disp: , Rfl:     fluticasone-umeclidinium-vilanterol (TRELEGY ELLIPTA) 100-62.5-25 mcg blister with device powder for inhalation, Inhale 1 puff daily., Disp: 60 each, Rfl: 3    losartan (COZAAR) 100 mg tablet, TAKE ONE TABLET BY MOUTH EVERY DAY, Disp: 90 tablet, Rfl: 0    multivitamin tablet, Take 1 tablet by mouth daily., Disp: , Rfl:     omega-3 fatty acids (FISH OIL CONCENTRATE ORAL), Take 1 capsule by mouth daily., Disp: , Rfl:   ALLERGIES      Patient has no known allergies.  FAMILY HISTORY      Family History   Problem Relation Name Age of Onset    Breast cancer Biological Mother      Heart disease Biological Father      Valvular heart disease Biological Father      Diabetes  "Other       SOCIAL HISTORY      Social History     Socioeconomic History    Marital status: Single     Spouse name: None    Number of children: None    Years of education: None    Highest education level: None   Tobacco Use    Smoking status: Former     Current packs/day: 0.00     Average packs/day: 0.3 packs/day for 40.0 years (10.0 ttl pk-yrs)     Types: Cigarettes     Start date: 1984     Quit date: 2024     Years since quittin.2    Smokeless tobacco: Never   Vaping Use    Vaping status: Never Used   Substance and Sexual Activity    Alcohol use: Yes     Comment: rarely    Drug use: Never    Sexual activity: Not Currently     Partners: Male     Social Drivers of Health     Food Insecurity: No Food Insecurity (2024)    Hunger Vital Sign     Worried About Running Out of Food in the Last Year: Never true     Ran Out of Food in the Last Year: Never true   Transportation Needs: No Transportation Needs (2024)    PRAPARE - Transportation     Lack of Transportation (Medical): No     Lack of Transportation (Non-Medical): No   Housing Stability: Unknown (2024)    Housing Stability Vital Sign     Unable to Pay for Housing in the Last Year: No     Unstable Housing in the Last Year: No     REVIEW OF SYSTEMS      .Review of Systems   All other systems reviewed and are negative.    PHYSICAL EXAMINATION      Visit Vitals  /86   Pulse 79   Temp 36.1 °C (97 °F) (Temporal)   Resp 18   Ht 1.715 m (5' 7.5\")   Wt 83.2 kg (183 lb 8 oz)   SpO2 95%   BMI 28.32 kg/m²     Body mass index is 28.32 kg/m².    Physical Exam  Vitals reviewed.   Constitutional:       General: She is not in acute distress.     Appearance: She is well-developed. She is not toxic-appearing or diaphoretic.   HENT:      Head: Normocephalic and atraumatic.   Eyes:      Extraocular Movements: Extraocular movements intact.      Pupils: Pupils are equal, round, and reactive to light.   Cardiovascular:      Rate and Rhythm: Normal rate and " regular rhythm.      Heart sounds: Normal heart sounds. No murmur heard.     No friction rub. No gallop.   Pulmonary:      Effort: Pulmonary effort is normal. No respiratory distress.      Breath sounds: Normal breath sounds. No wheezing or rales.   Musculoskeletal:         General: No deformity. Normal range of motion.      Cervical back: Normal range of motion and neck supple.      Right lower leg: No edema.      Left lower leg: No edema.   Skin:     General: Skin is warm and dry.      Coloration: Skin is not jaundiced.      Findings: No rash.   Neurological:      General: No focal deficit present.      Mental Status: She is alert and oriented to person, place, and time.   Psychiatric:         Mood and Affect: Mood normal.         Behavior: Behavior normal.       LABS / IMAGING / STUDIES      Lab Results   Component Value Date    CREATININE 0.69 07/24/2024    CREATININE 0.72 06/14/2023     Lab Results   Component Value Date    WBC 7.2 07/24/2024    HGB 15.7 07/24/2024    HCT 47.1 (H) 07/24/2024    MCV 99 (H) 07/24/2024     07/24/2024     Lab Results   Component Value Date    CHOL 217 (H) 07/24/2024    CHOL 204 (H) 06/14/2023    TRIG 180 (H) 07/24/2024    TRIG 125 06/14/2023    HDL 87 07/24/2024    HDL 90 06/14/2023     Lab Results   Component Value Date    HGBA1C 5.7 (H) 07/24/2024     Lab Results   Component Value Date    MICROALBUR 34.6 07/24/2024           HEALTH MAINTENANCE   ...   ASSESSMENT AND PLAN   Problem List Items Addressed This Visit          Respiratory    Pulmonary emphysema (CMS/HCC)    Relevant Medications    fluticasone-umeclidinium-vilanterol (TRELEGY ELLIPTA) 100-62.5-25 mcg blister with device powder for inhalation    Other Relevant Orders    CBC and differential    Comprehensive metabolic panel    Lipid panel    Hemoglobin A1c    TSH w reflex FT4    Microalbumin / creatinine urine ratio       Circulatory    Benign essential hypertension    Relevant Orders    CBC and differential     Comprehensive metabolic panel    Lipid panel    Hemoglobin A1c    TSH w reflex FT4    Microalbumin / creatinine urine ratio       Other    Chronic fatigue - Primary    STOP-BANG score of 3 today, home sleep study.         Relevant Orders    Home sleep test    CBC and differential    Comprehensive metabolic panel    Lipid panel    Hemoglobin A1c    TSH w reflex FT4    Microalbumin / creatinine urine ratio    Sedimentation rate, automated    C-reactive protein    Mixed hyperlipidemia    Relevant Orders    CBC and differential    Comprehensive metabolic panel    Lipid panel    Hemoglobin A1c    TSH w reflex FT4    Microalbumin / creatinine urine ratio     Other Visit Diagnoses         Personal history of tobacco use, presenting hazards to health        Relevant Orders    CBC and differential    Comprehensive metabolic panel    Lipid panel    Hemoglobin A1c    TSH w reflex FT4    Microalbumin / creatinine urine ratio    CT LUNG SCREENING WITHOUT IV CONTRAST      Routine general medical examination at a health care facility        Relevant Orders    CBC and differential    Comprehensive metabolic panel    Lipid panel    Hemoglobin A1c    TSH w reflex FT4    Microalbumin / creatinine urine ratio      Pulmonary nodule        Relevant Medications    fluticasone-umeclidinium-vilanterol (TRELEGY ELLIPTA) 100-62.5-25 mcg blister with device powder for inhalation    Other Relevant Orders    CT LUNG SCREENING WITHOUT IV CONTRAST                 Aroldo Ewing, DO  4/1/2025        Some or all of this note has been written using voice recognition software.    Please excuse any typographical errors.    On date of encounter, 30 minutes were spent exclusively dedicated to this encounter including pre-visit chart review and documentation, patient interview and physical examination, and post-visit documentation.

## 2025-04-03 ENCOUNTER — TELEPHONE (OUTPATIENT)
Dept: SLEEP MEDICINE | Facility: HOSPITAL | Age: 70
End: 2025-04-03
Payer: COMMERCIAL

## 2025-04-03 ENCOUNTER — RESULTS FOLLOW-UP (OUTPATIENT)
Dept: PRIMARY CARE | Facility: CLINIC | Age: 70
End: 2025-04-03

## 2025-04-03 LAB
ALBUMIN SERPL-MCNC: 4.7 G/DL (ref 3.9–4.9)
ALBUMIN/CREAT UR: 47 MG/G CREAT (ref 0–29)
ALP SERPL-CCNC: 86 IU/L (ref 44–121)
ALT SERPL-CCNC: 35 IU/L (ref 0–32)
AST SERPL-CCNC: 31 IU/L (ref 0–40)
BASOPHILS # BLD AUTO: 0.1 X10E3/UL (ref 0–0.2)
BASOPHILS NFR BLD AUTO: 1 %
BILIRUB SERPL-MCNC: 0.6 MG/DL (ref 0–1.2)
BUN SERPL-MCNC: 14 MG/DL (ref 8–27)
BUN/CREAT SERPL: 16 (ref 12–28)
CALCIUM SERPL-MCNC: 9.6 MG/DL (ref 8.7–10.3)
CHLORIDE SERPL-SCNC: 95 MMOL/L (ref 96–106)
CHOLEST SERPL-MCNC: 233 MG/DL (ref 100–199)
CO2 SERPL-SCNC: 21 MMOL/L (ref 20–29)
CREAT SERPL-MCNC: 0.86 MG/DL (ref 0.57–1)
CREAT UR-MCNC: 23.9 MG/DL
CRP SERPL-MCNC: 4 MG/L (ref 0–10)
EGFRCR SERPLBLD CKD-EPI 2021: 74 ML/MIN/1.73
EOSINOPHIL # BLD AUTO: 0.1 X10E3/UL (ref 0–0.4)
EOSINOPHIL NFR BLD AUTO: 1 %
ERYTHROCYTE [DISTWIDTH] IN BLOOD BY AUTOMATED COUNT: 12.3 % (ref 11.7–15.4)
ERYTHROCYTE [SEDIMENTATION RATE] IN BLOOD BY WESTERGREN METHOD: 4 MM/HR (ref 0–40)
GLOBULIN SER CALC-MCNC: 2.6 G/DL (ref 1.5–4.5)
GLUCOSE SERPL-MCNC: 100 MG/DL (ref 70–99)
HBA1C MFR BLD: 5.4 % (ref 4.8–5.6)
HCT VFR BLD AUTO: 42.4 % (ref 34–46.6)
HDLC SERPL-MCNC: 92 MG/DL
HGB BLD-MCNC: 14.5 G/DL (ref 11.1–15.9)
IMM GRANULOCYTES # BLD AUTO: 0.1 X10E3/UL (ref 0–0.1)
IMM GRANULOCYTES NFR BLD AUTO: 1 %
LDLC SERPL CALC-MCNC: 113 MG/DL (ref 0–99)
LYMPHOCYTES # BLD AUTO: 2.5 X10E3/UL (ref 0.7–3.1)
LYMPHOCYTES NFR BLD AUTO: 26 %
MCH RBC QN AUTO: 33.3 PG (ref 26.6–33)
MCHC RBC AUTO-ENTMCNC: 34.2 G/DL (ref 31.5–35.7)
MCV RBC AUTO: 97 FL (ref 79–97)
MICROALBUMIN UR-MCNC: 11.3 UG/ML
MONOCYTES # BLD AUTO: 0.7 X10E3/UL (ref 0.1–0.9)
MONOCYTES NFR BLD AUTO: 7 %
NEUTROPHILS # BLD AUTO: 6.1 X10E3/UL (ref 1.4–7)
NEUTROPHILS NFR BLD AUTO: 64 %
PLATELET # BLD AUTO: 267 X10E3/UL (ref 150–450)
POTASSIUM SERPL-SCNC: 5.5 MMOL/L (ref 3.5–5.2)
PROT SERPL-MCNC: 7.3 G/DL (ref 6–8.5)
RBC # BLD AUTO: 4.36 X10E6/UL (ref 3.77–5.28)
SODIUM SERPL-SCNC: 137 MMOL/L (ref 134–144)
T4 FREE SERPL-MCNC: 1.06 NG/DL (ref 0.82–1.77)
TRIGL SERPL-MCNC: 166 MG/DL (ref 0–149)
TSH SERPL DL<=0.005 MIU/L-ACNC: 1.78 UIU/ML (ref 0.45–4.5)
VLDLC SERPL CALC-MCNC: 28 MG/DL (ref 5–40)
WBC # BLD AUTO: 9.5 X10E3/UL (ref 3.4–10.8)

## 2025-04-03 NOTE — TELEPHONE ENCOUNTER
Left message to schedule home sleep testing pick-up.        Home sleep testing pick-ups are scheduled trainings done Monday-Friday during the day until 3pm , or at 7:15pm at night.  This training should take less than 20 minutes. You will take the home testing device home and use it that evening.     You wear a finger probe taped to your finger to measure your oxygen levels and heart rate.  You wear a belt around your chest and abdomen to monitor your breathing.  You wear nasal cannulas in your nostrils to monitor airflow.     Please call us to schedule at [facility] at [phone]

## 2025-04-08 ENCOUNTER — TELEPHONE (OUTPATIENT)
Dept: PULMONOLOGY | Facility: HOSPITAL | Age: 70
End: 2025-04-08
Payer: COMMERCIAL

## 2025-04-08 DIAGNOSIS — Z87.891 PERSONAL HISTORY OF TOBACCO USE, PRESENTING HAZARDS TO HEALTH: ICD-10-CM

## 2025-04-08 DIAGNOSIS — R91.1 PULMONARY NODULE: ICD-10-CM

## 2025-06-11 ENCOUNTER — TELEPHONE (OUTPATIENT)
Dept: SCHEDULING | Facility: CLINIC | Age: 70
End: 2025-06-11

## 2025-06-11 DIAGNOSIS — I10 BENIGN ESSENTIAL HYPERTENSION: ICD-10-CM

## 2025-06-11 DIAGNOSIS — E78.2 MIXED HYPERLIPIDEMIA: ICD-10-CM

## 2025-06-11 RX ORDER — LOSARTAN POTASSIUM 100 MG/1
100 TABLET ORAL DAILY
Qty: 90 TABLET | Refills: 0 | OUTPATIENT
Start: 2025-06-11

## 2025-06-11 RX ORDER — ATORVASTATIN CALCIUM 20 MG/1
20 TABLET, FILM COATED ORAL DAILY
Qty: 90 TABLET | Refills: 0 | OUTPATIENT
Start: 2025-06-11

## 2025-06-11 NOTE — TELEPHONE ENCOUNTER
DR VYAS  pt-- requests refill Atorvastatin and Losartan    Last OV was  6/15/2023  no appts scheduled    Notified Pharmacy to advise PT to call our office for an office visit for evaluation and any refills    Not escribed.    Please advise of any changes.    Thank you    PSR pool--please reach out to PT to make an appointment for evaluation and refills--Thank you

## 2025-06-11 NOTE — TELEPHONE ENCOUNTER
Called Michelle, Got VM- Left message that her last appt was June 2023 so she either needs to make an appt or she can get her refills from her PCP. [email or web address]

## 2025-06-12 RX ORDER — ATORVASTATIN CALCIUM 20 MG/1
20 TABLET, FILM COATED ORAL DAILY
Qty: 90 TABLET | Refills: 0 | Status: SHIPPED | OUTPATIENT
Start: 2025-06-12

## 2025-06-12 RX ORDER — LOSARTAN POTASSIUM 100 MG/1
100 TABLET ORAL DAILY
Qty: 90 TABLET | Refills: 0 | Status: SHIPPED | OUTPATIENT
Start: 2025-06-12

## 2025-08-07 RX ORDER — HYDROCHLOROTHIAZIDE 12.5 MG/1
12.5 TABLET ORAL DAILY
Qty: 90 TABLET | Refills: 0 | Status: SHIPPED | OUTPATIENT
Start: 2025-08-07

## 2025-08-07 NOTE — TELEPHONE ENCOUNTER
Medicine Refill Request    Last Office Visit: 4/1/2025   Last Consult Visit: Visit date not found  Last Telemedicine Visit: Visit date not found    Next Appointment: 10/2/2025      Current Outpatient Medications:     ascorbic acid (VITAMIN C) 500 mg tablet, Take 500 mg by mouth daily., Disp: , Rfl:     atorvastatin (LIPITOR) 20 mg tablet, Take 1 tablet (20 mg total) by mouth daily., Disp: 90 tablet, Rfl: 0    calcium carbonate/vitamin D3 (CALTRATE 600 PLUS D ORAL), Take 1 tablet by mouth 2 (two) times a day., Disp: , Rfl:     cholecalciferol, vitamin D3, 1,000 unit (25 mcg) tablet, Take 1,000 Units by mouth daily., Disp: , Rfl:     fluticasone-umeclidinium-vilanterol (TRELEGY ELLIPTA) 100-62.5-25 mcg blister with device powder for inhalation, Inhale 1 puff daily., Disp: 60 each, Rfl: 3    hydrochlorothiazide 12.5 mg tablet, Take 1 tablet (12.5 mg total) by mouth daily., Disp: 90 tablet, Rfl: 0    losartan (COZAAR) 100 mg tablet, TAKE ONE TABLET BY MOUTH EVERY DAY, Disp: 90 tablet, Rfl: 0    multivitamin tablet, Take 1 tablet by mouth daily., Disp: , Rfl:     omega-3 fatty acids (FISH OIL CONCENTRATE ORAL), Take 1 capsule by mouth daily., Disp: , Rfl:     BP Readings from Last 3 Encounters:   04/01/25 132/86   10/01/24 (!) 142/88   07/23/24 (!) 160/100       Recent Lab results:  Lab Results   Component Value Date    CHOL 233 (H) 04/02/2025   ,   Lab Results   Component Value Date    HDL 92 04/02/2025   ,   Lab Results   Component Value Date    LDLCALC 113 (H) 04/02/2025   ,   Lab Results   Component Value Date    TRIG 166 (H) 04/02/2025        Lab Results   Component Value Date    GLUCOSE 100 (H) 04/02/2025   ,   Lab Results   Component Value Date    HGBA1C 5.4 04/02/2025         Lab Results   Component Value Date    CREATININE 0.86 04/02/2025       Lab Results   Component Value Date    TSH 1.780 04/02/2025           Lab Results   Component Value Date    HGBA1C 5.4 04/02/2025   Medicine Refill Request    Last Office  Visit: 4/1/2025   Last Consult Visit: Visit date not found  Last Telemedicine Visit: Visit date not found    Next Appointment: 10/2/2025      Current Outpatient Medications:     ascorbic acid (VITAMIN C) 500 mg tablet, Take 500 mg by mouth daily., Disp: , Rfl:     atorvastatin (LIPITOR) 20 mg tablet, Take 1 tablet (20 mg total) by mouth daily., Disp: 90 tablet, Rfl: 0    calcium carbonate/vitamin D3 (CALTRATE 600 PLUS D ORAL), Take 1 tablet by mouth 2 (two) times a day., Disp: , Rfl:     cholecalciferol, vitamin D3, 1,000 unit (25 mcg) tablet, Take 1,000 Units by mouth daily., Disp: , Rfl:     fluticasone-umeclidinium-vilanterol (TRELEGY ELLIPTA) 100-62.5-25 mcg blister with device powder for inhalation, Inhale 1 puff daily., Disp: 60 each, Rfl: 3    hydrochlorothiazide 12.5 mg tablet, Take 1 tablet (12.5 mg total) by mouth daily., Disp: 90 tablet, Rfl: 0    losartan (COZAAR) 100 mg tablet, TAKE ONE TABLET BY MOUTH EVERY DAY, Disp: 90 tablet, Rfl: 0    multivitamin tablet, Take 1 tablet by mouth daily., Disp: , Rfl:     omega-3 fatty acids (FISH OIL CONCENTRATE ORAL), Take 1 capsule by mouth daily., Disp: , Rfl:     BP Readings from Last 3 Encounters:   04/01/25 132/86   10/01/24 (!) 142/88   07/23/24 (!) 160/100       Recent Lab results:  Lab Results   Component Value Date    CHOL 233 (H) 04/02/2025   ,   Lab Results   Component Value Date    HDL 92 04/02/2025   ,   Lab Results   Component Value Date    LDLCALC 113 (H) 04/02/2025   ,   Lab Results   Component Value Date    TRIG 166 (H) 04/02/2025        Lab Results   Component Value Date    GLUCOSE 100 (H) 04/02/2025   ,   Lab Results   Component Value Date    HGBA1C 5.4 04/02/2025         Lab Results   Component Value Date    CREATININE 0.86 04/02/2025       Lab Results   Component Value Date    TSH 1.780 04/02/2025           Lab Results   Component Value Date    HGBA1C 5.4 04/02/2025